# Patient Record
Sex: MALE | Race: WHITE | URBAN - METROPOLITAN AREA
[De-identification: names, ages, dates, MRNs, and addresses within clinical notes are randomized per-mention and may not be internally consistent; named-entity substitution may affect disease eponyms.]

---

## 2018-01-30 ENCOUNTER — OFFICE VISIT (OUTPATIENT)
Dept: INTERNAL MEDICINE CLINIC | Age: 63
End: 2018-01-30

## 2018-01-30 VITALS
WEIGHT: 138.8 LBS | HEIGHT: 67 IN | BODY MASS INDEX: 21.79 KG/M2 | HEART RATE: 61 BPM | TEMPERATURE: 97.8 F | SYSTOLIC BLOOD PRESSURE: 140 MMHG | OXYGEN SATURATION: 98 % | RESPIRATION RATE: 14 BRPM | DIASTOLIC BLOOD PRESSURE: 88 MMHG

## 2018-01-30 DIAGNOSIS — Z00.00 WELL ADULT EXAM: Primary | ICD-10-CM

## 2018-01-30 DIAGNOSIS — R03.0 ELEVATED BP WITHOUT DIAGNOSIS OF HYPERTENSION: ICD-10-CM

## 2018-01-30 DIAGNOSIS — E55.9 VITAMIN D DEFICIENCY: ICD-10-CM

## 2018-01-30 DIAGNOSIS — D12.6 TUBULAR ADENOMA OF COLON: ICD-10-CM

## 2018-01-30 DIAGNOSIS — H61.23 BILATERAL IMPACTED CERUMEN: ICD-10-CM

## 2018-01-30 NOTE — PATIENT INSTRUCTIONS
Earwax Blockage: Care Instructions  Your Care Instructions    Earwax is a natural substance that protects the ear canal. Normally, earwax drains from the ears and does not cause problems. Sometimes earwax builds up and hardens. Earwax blockage (also called cerumen impaction) can cause some loss of hearing and pain. When wax is tightly packed, you will need to have your doctor remove it. Follow-up care is a key part of your treatment and safety. Be sure to make and go to all appointments, and call your doctor if you are having problems. It's also a good idea to know your test results and keep a list of the medicines you take. How can you care for yourself at home? · Do not try to remove earwax with cotton swabs, fingers, or other objects. This can make the blockage worse and damage the eardrum. · If your doctor recommends that you try to remove earwax at home:  ¨ Soften and loosen the earwax with warm mineral oil. You also can try hydrogen peroxide mixed with an equal amount of room temperature water. Place 2 drops of the fluid, warmed to body temperature, in the ear two times a day for up to 5 days. ¨ Once the wax is loose and soft, all that is usually needed to remove it from the ear canal is a gentle, warm shower. Direct the water into the ear, then tip your head to let the earwax drain out. Dry your ear thoroughly with a hair dryer set on low. Hold the dryer several inches from your ear. ¨ If the warm mineral oil and shower do not work, use an over-the-counter wax softener followed by gentle flushing with an ear syringe each night for a week or two. Make sure the flushing solution is body temperature. Cool or hot fluids in the ear can cause dizziness. When should you call for help? Call your doctor now or seek immediate medical care if:  ? · Pus or blood drains from your ear. ? · Your ears are ringing or feel full. ? · You have a loss of hearing. ? Watch closely for changes in your health, and be sure to contact your doctor if:  ? · You have pain or reduced hearing after 1 week of home treatment. ? · You have any new symptoms, such as nausea or balance problems. Where can you learn more? Go to http://meghan-della.info/. Enter H237 in the search box to learn more about \"Earwax Blockage: Care Instructions. \"  Current as of: March 20, 2017  Content Version: 11.4  © 8457-1498 [employer]. Care instructions adapted under license by [employer] (which disclaims liability or warranty for this information). If you have questions about a medical condition or this instruction, always ask your healthcare professional. [place] [address] any warranty or liability for your use of this information. Learning About Vitamin D  Why is it important to get enough vitamin D? Your body needs vitamin D to absorb calcium. Calcium keeps your bones and muscles, including your heart, healthy and strong. If your muscles don't get enough calcium, they can cramp, hurt, or feel weak. You may have long-term (chronic) muscle aches and pains. If you don't get enough vitamin D throughout life, you have an increased chance of having thin and brittle bones (osteoporosis) in your later years. Children who don't get enough vitamin D may not grow as much as others their age. They also have a chance of getting a rare disease called rickets. It causes weak bones. Vitamin D and calcium are added to many foods. And your body uses sunshine to make its own vitamin D. How much vitamin D do you need? The [place] recommends that people ages 3 through 79 get 600 IU (international units) every day. Adults 71 and older need 800 IU every day. Blood tests for vitamin D can check your vitamin D level. But there is no standard normal range used by all laboratories. The [place] [employer] recommends a blood level of 20 ng/mL of vitamin D for healthy bones.  And most people in the [address] [place] [address] meet this goal.  How can you get more vitamin D? Foods that contain vitamin D include:  · [place], tuna, and mackerel. These are some of the best foods to eat when you need to get more vitamin D.  · Cheese, egg yolks, and beef liver. These foods have vitamin D in small amounts. · Milk, soy drinks, orange juice, yogurt, margarine, and some kinds of cereal have vitamin D added to them. Some people don't make vitamin D as well as others. They may have to take extra care in getting enough vitamin D. Things that reduce how much vitamin D your body makes include:  · Dark skin, such as many  Americans have. · Age, especially if you are older than 72. · Digestive problems, such as Crohn's or celiac disease. · Liver and kidney disease. Some people who do not get enough vitamin D may need supplements. Are there any risks from taking vitamin D?  · Too much vitamin D:  ¨ Can damage your kidneys. ¨ Can cause nausea and vomiting, constipation, and weakness. ¨ Raises the amount of calcium in your blood. If this happens, you can get confused or have an irregular heart rhythm. · Vitamin D may interact with other medicines. Tell your doctor about all of the medicines you take, including over-the-counter drugs, herbs, and pills. Tell your doctor about all of your current medical problems. Where can you learn more? Go to http://meghan-della.info/. Enter [id number] in the search box to learn more about \"Learning About Vitamin D.\"  Current as of: May 12, 2017  Content Version: 11.4  © 8811-7916 [employer]. Care instructions adapted under license by [employer] (which disclaims liability or warranty for this information). If you have questions about a medical condition or this instruction, always ask your healthcare professional. [place] [address] any warranty or liability for your use of this information.        Well Visit, Men 48 to 72: Care Instructions  Your Care Instructions    Physical exams can help you stay healthy. Your doctor has checked your overall health and may have suggested ways to take good care of yourself. He or she also may have recommended tests. At home, you can help prevent illness with healthy eating, regular exercise, and other steps. Follow-up care is a key part of your treatment and safety. Be sure to make and go to all appointments, and call your doctor if you are having problems. It's also a good idea to know your test results and keep a list of the medicines you take. How can you care for yourself at home? · Reach and stay at a healthy weight. This will lower your risk for many problems, such as obesity, diabetes, heart disease, and high blood pressure. · Get at least 30 minutes of exercise on most days of the week. Walking is a good choice. You also may want to do other activities, such as running, swimming, cycling, or playing tennis or team sports. · Do not smoke. Smoking can make health problems worse. If you need help quitting, talk to your doctor about stop-smoking programs and medicines. These can increase your chances of quitting for good. · Protect your skin from too much sun. When you're outdoors from 10 a.m. to 4 p.m., stay in the shade or cover up with clothing and a hat with a wide brim. Wear sunglasses that block UV rays. Even when it's cloudy, put broad-spectrum sunscreen (SPF 30 or higher) on any exposed skin. · See a dentist one or two times a year for checkups and to have your teeth cleaned. · Wear a seat belt in the car. · Limit alcohol to 2 drinks a day. Too much alcohol can cause health problems. Follow your doctor's advice about when to have certain tests. These tests can spot problems early. · Cholesterol. Your doctor will tell you how often to have this done based on your overall health and other things that can increase your risk for heart attack and stroke.   · Blood pressure. Have your blood pressure checked during a routine doctor visit. Your doctor will tell you how often to check your blood pressure based on your age, your blood pressure results, and other factors. · Prostate exam. Talk to your doctor about whether you should have a blood test (called a PSA test) for prostate cancer. Experts disagree on whether men should have this test. Some experts recommend that you discuss the benefits and risks of the test with your doctor. · Diabetes. Ask your doctor whether you should have tests for diabetes. · Vision. Some experts recommend that you have yearly exams for glaucoma and other age-related eye problems starting at age 48. · Hearing. Tell your doctor if you notice any change in your hearing. You can have tests to find out how well you hear. · Colon cancer. You should begin tests for colon cancer at age 48. You may have one of several tests. Your doctor will tell you how often to have tests based on your age and risk. Risks include whether you already had a precancerous polyp removed from your colon or whether your parent, brother, sister, or child has had colon cancer. · Heart attack and stroke risk. At least every 4 to 6 years, you should have your risk for heart attack and stroke assessed. Your doctor uses factors such as your age, blood pressure, cholesterol, and whether you smoke or have diabetes to show what your risk for a heart attack or stroke is over the next 10 years. · Abdominal aortic aneurysm. Ask your doctor whether you should have a test to check for an aneurysm. You may need a test if you ever smoked or if your parent, brother, sister, or child has had an aneurysm. When should you call for help? Watch closely for changes in your health, and be sure to contact your doctor if you have any problems or symptoms that concern you. Where can you learn more? Go to http://meghan-della.info/.   Enter E833 in the search box to learn more about \"Well Visit, Men 48 to 72: Care Instructions. \"  Current as of: May 12, 2017  Content Version: 11.4  © 5092-6038 [employer]. Care instructions adapted under license by [employer] (which disclaims liability or warranty for this information). If you have questions about a medical condition or this instruction, always ask your healthcare professional. [address] any warranty or liability for your use of this information. Elevated Blood Pressure: Care Instructions  Your Care Instructions    Blood pressure is a measure of how hard the blood pushes against the walls of your arteries. It's normal for blood pressure to go up and down throughout the day. But if it stays up over time, you have high blood pressure. Two numbers tell you your blood pressure. The first number is the systolic pressure. It shows how hard the blood pushes when your heart is pumping. The second number is the diastolic pressure. It shows how hard the blood pushes between heartbeats, when your heart is relaxed and filling with blood. An ideal blood pressure in adults is less than 120/80 (say \"120 over 80\"). High blood pressure is 140/90 or higher. You have high blood pressure if your top number is 140 or higher or your bottom number is 90 or higher, or both. The main test for high blood pressure is simple, fast, and painless. To diagnose high blood pressure, your doctor will test your blood pressure at different times. After testing your blood pressure, your doctor may ask you to test it again when you are home. If you are diagnosed with high blood pressure, you can work with your doctor to make a long-term plan to manage it. Follow-up care is a key part of your treatment and safety. Be sure to make and go to all appointments, and call your doctor if you are having problems. It's also a good idea to know your test results and keep a list of the medicines you take.   How can you care for yourself at home? · Do not smoke. Smoking increases your risk for heart attack and stroke. If you need help quitting, talk to your doctor about stop-smoking programs and medicines. These can increase your chances of quitting for good. · Stay at a healthy weight. · Try to limit how much sodium you eat to less than 2,300 milligrams (mg) a day. Your doctor may ask you to try to eat less than 1,500 mg a day. · Be physically active. Get at least 30 minutes of exercise on most days of the week. Walking is a good choice. You also may want to do other activities, such as running, swimming, cycling, or playing tennis or team sports. · Avoid or limit alcohol. Talk to your doctor about whether you can drink any alcohol. · Eat plenty of fruits, vegetables, and low-fat dairy products. Eat less saturated and total fats. · Learn how to check your blood pressure at home. When should you call for help? Call your doctor now or seek immediate medical care if:  ? · Your blood pressure is much higher than normal (such as 180/110 or higher). ? · You think high blood pressure is causing symptoms such as:  ¨ Severe headache. ¨ Blurry vision. ? Watch closely for changes in your health, and be sure to contact your doctor if:  ? · You do not get better as expected. Where can you learn more? Go to http://meghan-della.info/. Enter L369 in the search box to learn more about \"Elevated Blood Pressure: Care Instructions. \"  Current as of: September 21, 2016  Content Version: 11.4  © 1112-7292 [employer]. Care instructions adapted under license by [employer] (which disclaims liability or warranty for this information). If you have questions about a medical condition or this instruction, always ask your healthcare professional. [address] any warranty or liability for your use of this information.

## 2018-01-30 NOTE — PROGRESS NOTES
HISTORY OF PRESENT ILLNESS  Hudson Chandler is a 60 y.o. male. HPI  Hudson Chandler is here for complete health maintenance physical exam and screening. he does have other concerns. Has been Vitamin D deficient in the past and has been taking 2000 units of Vitamin D3 daily. He has multiple questions regarding Medicare and what is covered and not covered. Health maintenance hx includes:  Exercise: moderately active. Form of exercise: uses recumbent bike every other day   Diet: generally follows a low fat low cholesterol diet  retired  Cancer screening:    Colon cancer screening:  Last Colonoscopy: 2013 and was advised to follow up in 5 years; has history of tubular adenoma   Prostate cancer screening: PSA and/or SHARON:   Lab Results   Component Value Date/Time    Prostate Specific Ag 0.4 12/30/2016 10:47 AM    Prostate Specific Ag 0.4 12/31/2015 12:05 PM    Prostate Specific Ag 0.6 12/31/2014 11:21 AM    Prostate Specific Ag 0.2 12/09/2009 11:35 AM          Lab Results   Component Value Date/Time    Cholesterol, total 222 12/30/2016 10:47 AM    HDL Cholesterol 116 12/30/2016 10:47 AM    LDL, calculated 93 12/30/2016 10:47 AM    VLDL, calculated 13 12/30/2016 10:47 AM    Triglyceride 64 12/30/2016 10:47 AM    CHOL/HDL Ratio 2.1 12/09/2009 11:35 AM       Lab Results   Component Value Date/Time    Glucose 94 12/30/2016 10:47 AM         Immunizations:     Immunization History   Administered Date(s) Administered    Influenza Vaccine 09/29/2013, 09/23/2014, 10/05/2015, 10/14/2016, 09/18/2017    TDAP Vaccine 11/10/2011    Zoster Vaccine, Live 08/19/2015      Immunization status: up to date and documented.        Social History     Social History    Marital status:      Spouse name: Edgardo Stewart Number of children: 2    Years of education: N/A     Occupational History    Disability due to leg injures      Social History Main Topics    Smoking status: Never Smoker    Smokeless tobacco: Never Used   Haily Wallace Alcohol use 6.6 oz/week     5 Standard drinks or equivalent, 6 Cans of beer per week    Drug use: No    Sexual activity: Yes     Partners: Female     Other Topics Concern    Not on file     Social History Narrative    2 children Wily Crouch 26, [id number]     Past Surgical History:   Procedure Laterality Date    COLONOSCOPY  12.23.09    2 tubular adenomas, Dr. Colby Zepeda, repeat 12/2012    HX COLONOSCOPY  7/2013    HX FREE SKIN GRAFT      legs     Family History   Problem Relation Age of Onset    Adopted: Yes    No Known Problems Daughter     No Known Problems Son      Current Outpatient Prescriptions on File Prior to Visit   Medication Sig Dispense Refill    cholecalciferol (VITAMIN D3) 1,000 unit cap Take 2,000 Units by mouth daily. No current facility-administered medications on file prior to visit. .  Review of Systems   Constitutional: Negative for chills, fever and malaise/fatigue. HENT: Negative for congestion, ear pain, sinus pain and sore throat. Respiratory: Negative for cough and shortness of breath. Cardiovascular: Negative for chest pain, palpitations and leg swelling. Gastrointestinal: Negative for abdominal pain, blood in stool, constipation, diarrhea, nausea and vomiting. Genitourinary: Negative for dysuria, frequency and hematuria. Musculoskeletal: Negative for myalgias. Skin: Negative for rash. Neurological: Negative for dizziness, tingling and headaches. Psychiatric/Behavioral: Negative for depression. The patient is nervous/anxious. /88 (BP 1 Location: Right arm, BP Patient Position: Sitting)  Pulse 61  Temp 97.8 °F (36.6 °C) (Oral)   Resp 14  Ht 5' 7\" (1.702 m)  Wt 138 lb 12.8 oz (63 kg)  SpO2 98%  BMI 21.74 kg/m2  Physical Exam   Constitutional: He is oriented to person, place, and time. He appears well-developed and well-nourished. HENT:   Head: Normocephalic and atraumatic.    Right Ear: External ear normal.   Left Ear: External ear normal. Nose: Nose normal.   Mouth/Throat: Oropharynx is clear and moist.   Eyes: Conjunctivae are normal.   Neck: Normal range of motion. Neck supple. Cardiovascular: Normal rate, regular rhythm and normal heart sounds. Pulmonary/Chest: Effort normal and breath sounds normal. He has no wheezes. Abdominal: Soft. Bowel sounds are normal. There is no tenderness. There is no rebound. Musculoskeletal:   Large scars bilateral lower legs   Neurological: He is alert and oriented to person, place, and time. Skin: Skin is warm and dry. Psychiatric: He has a normal mood and affect. His behavior is normal.   Nursing note and vitals reviewed. ASSESSMENT and PLAN  Diagnoses and all orders for this visit:    1. Well adult exam - advised him to check Medicare website and other supplemental insurances to see what is covered as this is varied. -     CBC WITH AUTOMATED DIFF  -     LIPID PANEL  -     METABOLIC PANEL, COMPREHENSIVE  -     PROSTATE SPECIFIC AG  -     TSH 3RD GENERATION  -     URINALYSIS W/ RFLX MICROSCOPIC  -     AMB POC FECAL BLOOD, OCCULT, QL 1 CARD    2. Tubular adenoma of colon -- due to follow up with colonoscopy every 5 years  -     REFERRAL TO GASTROENTEROLOGY    3. Vitamin D deficiency  -     VITAMIN D, 25 HYDROXY    4. Bilateral impacted cerumen -- large amount of cerumen removed from bilateral ear canals via irrigation  -     IN REMOVAL IMPACTED CERUMEN IRRIGATION/LVG UNILAT    5.  Elevated BP without diagnosis of hypertension -- advised to check his blood pressures outside of medical office      lab results and schedule of future lab studies reviewed with patient  reviewed diet, exercise and weight control  cardiovascular risk and specific lipid/LDL goals reviewed  reviewed medications and side effects in detail

## 2018-01-30 NOTE — MR AVS SNAPSHOT
[address] [place] [address] 
 
 
 [address] [place] [address] [facility] 
[phone] Patient: Fiordaliza Marti MRN: [id number] JMF:[id number] Visit Information Date & Time Provider Department Dept. Phone Encounter #  
 1/30/2018 10:00 AM Maritza Ferrari NP [facility] [address]  [id number] Upcoming Health Maintenance Date Due Hepatitis C Screening 1955 DTaP/Tdap/Td series (2 - Td) 11/10/2021 COLONOSCOPY 11/9/2023 Allergies as of 1/30/2018  Review Complete On: 1/30/2018 By: Maritza Ferrari NP Severity Noted Reaction Type Reactions Shellfish Containing Products  09/01/2010   Side Effect Hives Current Immunizations  Reviewed on 1/30/2018 Name Date Influenza Vaccine 9/18/2017, 10/14/2016, 10/5/2015, 9/23/2014, 9/29/2013 TDAP Vaccine 11/10/2011 Zoster Vaccine, Live 8/19/2015 Reviewed by Maritza Ferrari NP on 1/30/2018 at 10:33 AM  
You Were Diagnosed With   
  
 Codes Comments Well adult exam    -  Primary ICD-10-CM: Z00.00 ICD-9-CM: V70.0 Tubular adenoma of colon     ICD-10-CM: D12.6 ICD-9-CM: 211.3 Vitamin D deficiency     ICD-10-CM: E55.9 ICD-9-CM: 268.9 Bilateral impacted cerumen     ICD-10-CM: H61.23 
ICD-9-CM: 380.4 Elevated BP without diagnosis of hypertension     ICD-10-CM: R03.0 ICD-9-CM: 796.2 Vitals BP Pulse Temp Resp Height(growth percentile) Weight(growth percentile) 140/88 (BP 1 Location: Right arm, BP Patient Position: Sitting) 61 97.8 °F (36.6 °C) (Oral) 14 5' 7\" (1.702 m) 138 lb 12.8 oz (63 kg) SpO2 BMI Smoking Status 98% 21.74 kg/m2 Never Smoker Vitals History BMI and BSA Data Body Mass Index Body Surface Area 21.74 kg/m 2 1.73 m 2 Preferred Pharmacy Pharmacy Name Phone [id number] [place], [address] ([place] [address]) [phone] Your Updated Medication List  
  
   
This list is accurate as of: 1/30/18 10:48 AM.  Always use your most recent med list.  
  
  
  
  
 VITAMIN D3 1,000 unit Cap Generic drug:  cholecalciferol Take 2,000 Units by mouth daily. We Performed the Following AMB POC FECAL BLOOD, OCCULT, QL 1 CARD [89827 CPT(R)] CBC WITH AUTOMATED DIFF [41384 CPT(R)] LIPID PANEL [86951 CPT(R)] METABOLIC PANEL, COMPREHENSIVE [31667 CPT(R)] VA REMOVAL IMPACTED CERUMEN IRRIGATION/LVG UNILAT [07727 CPT(R)] PSA, DIAGNOSTIC (PROSTATE SPECIFIC AG) [id number] CPT(R)] REFERRAL TO GASTROENTEROLOGY [UBI38 Custom] TSH 3RD GENERATION [00721 CPT(R)] URINALYSIS W/ RFLX MICROSCOPIC [86284 CPT(R)] VITAMIN D, 25 HYDROXY [id number] CPT(R)] Referral Information Referral ID Referred By Referred To  
  
 [id number] Solange Quintero MD   
   [address] Phone: [phone] Fax: [phone] Visits Status Start Date End Date 1 New Request 1/30/18 1/30/19 If your referral has a status of pending review or denied, additional information will be sent to support the outcome of this decision. Patient Instructions Earwax Blockage: Care Instructions Your Care Instructions Earwax is a natural substance that protects the ear canal. Normally, earwax drains from the ears and does not cause problems. Sometimes earwax builds up and hardens. Earwax blockage (also called cerumen impaction) can cause some loss of hearing and pain. When wax is tightly packed, you will need to have your doctor remove it. Follow-up care is a key part of your treatment and safety. Be sure to make and go to all appointments, and call your doctor if you are having problems. It's also a good idea to know your test results and keep a list of the medicines you take. How can you care for yourself at home? · Do not try to remove earwax with cotton swabs, fingers, or other objects. This can make the blockage worse and damage the eardrum. · If your doctor recommends that you try to remove earwax at home: ¨ Soften and loosen the earwax with warm mineral oil. You also can try hydrogen peroxide mixed with an equal amount of room temperature water. Place 2 drops of the fluid, warmed to body temperature, in the ear two times a day for up to 5 days. ¨ Once the wax is loose and soft, all that is usually needed to remove it from the ear canal is a gentle, warm shower. Direct the water into the ear, then tip your head to let the earwax drain out. Dry your ear thoroughly with a hair dryer set on low. Hold the dryer several inches from your ear. ¨ If the warm mineral oil and shower do not work, use an over-the-counter wax softener followed by gentle flushing with an ear syringe each night for a week or two. Make sure the flushing solution is body temperature. Cool or hot fluids in the ear can cause dizziness. When should you call for help? Call your doctor now or seek immediate medical care if: 
? · Pus or blood drains from your ear. ? · Your ears are ringing or feel full. ? · You have a loss of hearing. ? Watch closely for changes in your health, and be sure to contact your doctor if: 
? · You have pain or reduced hearing after 1 week of home treatment. ? · You have any new symptoms, such as nausea or balance problems. Where can you learn more? Go to http://meghan-della.info/. Enter F137 in the search box to learn more about \"Earwax Blockage: Care Instructions. \" Current as of: March 20, 2017 Content Version: 11.4 © 6166-6991 [employer]. Care instructions adapted under license by [employer] (which disclaims liability or warranty for this information).  If you have questions about a medical condition or this instruction, always ask your healthcare professional. [address] any warranty or liability for your use of this information. Learning About Vitamin D Why is it important to get enough vitamin D? Your body needs vitamin D to absorb calcium. Calcium keeps your bones and muscles, including your heart, healthy and strong. If your muscles don't get enough calcium, they can cramp, hurt, or feel weak. You may have long-term (chronic) muscle aches and pains. If you don't get enough vitamin D throughout life, you have an increased chance of having thin and brittle bones (osteoporosis) in your later years. Children who don't get enough vitamin D may not grow as much as others their age. They also have a chance of getting a rare disease called rickets. It causes weak bones. Vitamin D and calcium are added to many foods. And your body uses sunshine to make its own vitamin D. How much vitamin D do you need? The [place] of Medicine recommends that people ages 3 through 79 get 600 IU (international units) every day. Adults 71 and older need 800 IU every day. Blood tests for vitamin D can check your vitamin D level. But there is no standard normal range used by all laboratories. The [place] of Medicine recommends a blood level of 20 ng/mL of vitamin D for healthy bones. And most people in the [place] and [facility]) meet this goal. 
How can you get more vitamin D? Foods that contain vitamin D include: 
· Alto, tuna, and mackerel. These are some of the best foods to eat when you need to get more vitamin D. 
· Cheese, egg yolks, and beef liver. These foods have vitamin D in small amounts. · Milk, soy drinks, orange juice, yogurt, margarine, and some kinds of cereal have vitamin D added to them. Some people don't make vitamin D as well as others. They may have to take extra care in getting enough vitamin D. Things that reduce how much vitamin D your body makes include: · Dark skin, such as many  Americans have. · Age, especially if you are older than 72. · Digestive problems, such as Crohn's or celiac disease. · Liver and kidney disease. Some people who do not get enough vitamin D may need supplements. Are there any risks from taking vitamin D? 
· Too much vitamin D: 
¨ Can damage your kidneys. ¨ Can cause nausea and vomiting, constipation, and weakness. ¨ Raises the amount of calcium in your blood. If this happens, you can get confused or have an irregular heart rhythm. · Vitamin D may interact with other medicines. Tell your doctor about all of the medicines you take, including over-the-counter drugs, herbs, and pills. Tell your doctor about all of your current medical problems. Where can you learn more? Go to http://meghan-della.info/. Enter [id number] in the search box to learn more about \"Learning About Vitamin D.\" 
Current as of: May 12, 2017 Content Version: 11.4 © 6971-6941 [employer]. Care instructions adapted under license by [employer] (which disclaims liability or warranty for this information). If you have questions about a medical condition or this instruction, always ask your healthcare professional. [place] [address] any warranty or liability for your use of this information. Well Visit, Men 48 to 72: Care Instructions Your Care Instructions Physical exams can help you stay healthy. Your doctor has checked your overall health and may have suggested ways to take good care of yourself. He or she also may have recommended tests. At home, you can help prevent illness with healthy eating, regular exercise, and other steps. Follow-up care is a key part of your treatment and safety. Be sure to make and go to all appointments, and call your doctor if you are having problems. It's also a good idea to know your test results and keep a list of the medicines you take. How can you care for yourself at home? · Reach and stay at a healthy weight. This will lower your risk for many problems, such as obesity, diabetes, heart disease, and high blood pressure. · Get at least 30 minutes of exercise on most days of the week. Walking is a good choice. You also may want to do other activities, such as running, swimming, cycling, or playing tennis or team sports. · Do not smoke. Smoking can make health problems worse. If you need help quitting, talk to your doctor about stop-smoking programs and medicines. These can increase your chances of quitting for good. · Protect your skin from too much sun. When you're outdoors from 10 a.m. to 4 p.m., stay in the shade or cover up with clothing and a hat with a wide brim. Wear sunglasses that block UV rays. Even when it's cloudy, put broad-spectrum sunscreen (SPF 30 or higher) on any exposed skin. · See a dentist one or two times a year for checkups and to have your teeth cleaned. · Wear a seat belt in the car. · Limit alcohol to 2 drinks a day. Too much alcohol can cause health problems. Follow your doctor's advice about when to have certain tests. These tests can spot problems early. · Cholesterol. Your doctor will tell you how often to have this done based on your overall health and other things that can increase your risk for heart attack and stroke. · Blood pressure. Have your blood pressure checked during a routine doctor visit. Your doctor will tell you how often to check your blood pressure based on your age, your blood pressure results, and other factors. · Prostate exam. Talk to your doctor about whether you should have a blood test (called a PSA test) for prostate cancer. Experts disagree on whether men should have this test. Some experts recommend that you discuss the benefits and risks of the test with your doctor. · Diabetes. Ask your doctor whether you should have tests for diabetes. · Vision. Some experts recommend that you have yearly exams for glaucoma and other age-related eye problems starting at age 48. · Hearing. Tell your doctor if you notice any change in your hearing. You can have tests to find out how well you hear. · Colon cancer. You should begin tests for colon cancer at age 48. You may have one of several tests. Your doctor will tell you how often to have tests based on your age and risk. Risks include whether you already had a precancerous polyp removed from your colon or whether your parent, brother, sister, or child has had colon cancer. · Heart attack and stroke risk. At least every 4 to 6 years, you should have your risk for heart attack and stroke assessed. Your doctor uses factors such as your age, blood pressure, cholesterol, and whether you smoke or have diabetes to show what your risk for a heart attack or stroke is over the next 10 years. · Abdominal aortic aneurysm. Ask your doctor whether you should have a test to check for an aneurysm. You may need a test if you ever smoked or if your parent, brother, sister, or child has had an aneurysm. When should you call for help? Watch closely for changes in your health, and be sure to contact your doctor if you have any problems or symptoms that concern you. Where can you learn more? Go to http://meghan-della.info/. Enter E201 in the search box to learn more about \"Well Visit, Men 48 to 72: Care Instructions. \" Current as of: May 12, 2017 Content Version: 11.4 © 0052-4909 [employer]. Care instructions adapted under license by [employer] (which disclaims liability or warranty for this information). If you have questions about a medical condition or this instruction, always ask your healthcare professional. [place] [address] any warranty or liability for your use of this information. Elevated Blood Pressure: Care Instructions Your Care Instructions Blood pressure is a measure of how hard the blood pushes against the walls of your arteries. It's normal for blood pressure to go up and down throughout the day. But if it stays up over time, you have high blood pressure. Two numbers tell you your blood pressure. The first number is the systolic pressure. It shows how hard the blood pushes when your heart is pumping. The second number is the diastolic pressure. It shows how hard the blood pushes between heartbeats, when your heart is relaxed and filling with blood. An ideal blood pressure in adults is less than 120/80 (say \"120 over 80\"). High blood pressure is 140/90 or higher. You have high blood pressure if your top number is 140 or higher or your bottom number is 90 or higher, or both. The main test for high blood pressure is simple, fast, and painless. To diagnose high blood pressure, your doctor will test your blood pressure at different times. After testing your blood pressure, your doctor may ask you to test it again when you are home. If you are diagnosed with high blood pressure, you can work with your doctor to make a long-term plan to manage it. Follow-up care is a key part of your treatment and safety. Be sure to make and go to all appointments, and call your doctor if you are having problems. It's also a good idea to know your test results and keep a list of the medicines you take. How can you care for yourself at home? · Do not smoke. Smoking increases your risk for heart attack and stroke. If you need help quitting, talk to your doctor about stop-smoking programs and medicines. These can increase your chances of quitting for good. · Stay at a healthy weight. · Try to limit how much sodium you eat to less than 2,300 milligrams (mg) a day. Your doctor may ask you to try to eat less than 1,500 mg a day. · Be physically active. Get at least 30 minutes of exercise on most days of the week. Walking is a good choice.  You also may want to do other activities, such as running, swimming, cycling, or playing tennis or team sports. · Avoid or limit alcohol. Talk to your doctor about whether you can drink any alcohol. · Eat plenty of fruits, vegetables, and low-fat dairy products. Eat less saturated and total fats. · Learn how to check your blood pressure at home. When should you call for help? Call your doctor now or seek immediate medical care if: 
? · Your blood pressure is much higher than normal (such as 180/110 or higher). ? · You think high blood pressure is causing symptoms such as: ¨ Severe headache. ¨ Blurry vision. ? Watch closely for changes in your health, and be sure to contact your doctor if: 
? · You do not get better as expected. Where can you learn more? Go to http://meghan-della.info/. Enter L057 in the search box to learn more about \"Elevated Blood Pressure: Care Instructions. \" Current as of: September 21, 2016 Content Version: 11.4 © 7543-5689 [employer]. Care instructions adapted under license by [employer] (which disclaims liability or warranty for this information). If you have questions about a medical condition or this instruction, always ask your healthcare professional. [address] any warranty or liability for your use of this information. Introducing [facility] [place] [facility]! Leticia Ly introduces TechDevils patient portal. Now you can access parts of your medical record, email your doctor's office, and request medication refills online. 1. In your internet browser, go to [email or web address] 2. Click on the First Time User? Click Here link in the Sign In box. You will see the New Member Sign Up page. 3. Enter your TechDevils Access Code exactly as it appears below. You will not need to use this code after youve completed the sign-up process. If you do not sign up before the expiration date, you must request a new code. · WorldDoc Access Code: [email or web address] Expires: 4/30/2018 10:35 AM 
 
4. Enter the last four digits of your Social Security Number (xxxx) and Date of Birth (mm/dd/yyyy) as indicated and click Submit. You will be taken to the next sign-up page. 5. Create a WorldDoc ID. This will be your WorldDoc login ID and cannot be changed, so think of one that is secure and easy to remember. 6. Create a WorldDoc password. You can change your password at any time. 7. Enter your Password Reset Question and Answer. This can be used at a later time if you forget your password. 8. Enter your e-mail address. You will receive e-mail notification when new information is available in [address] 9. Click Sign Up. You can now view and download portions of your medical record. 10. Click the Download Summary menu link to download a portable copy of your medical information. If you have questions, please visit the Frequently Asked Questions section of the WorldDoc website. Remember, WorldDoc is NOT to be used for urgent needs. For medical emergencies, dial 911. Now available from your iPhone and Android! Please provide this summary of care documentation to your next provider. Your primary care clinician is listed as Rachel Diego. If you have any questions after today's visit, please call [phone].

## 2018-01-31 LAB
25(OH)D3+25(OH)D2 SERPL-MCNC: 48.2 NG/ML (ref 30–100)
ALBUMIN SERPL-MCNC: 4.4 G/DL (ref 3.6–4.8)
ALBUMIN/GLOB SERPL: 1.8 {RATIO} (ref 1.2–2.2)
ALP SERPL-CCNC: 43 IU/L (ref 39–117)
ALT SERPL-CCNC: 19 IU/L (ref 0–44)
APPEARANCE UR: ABNORMAL
AST SERPL-CCNC: 24 IU/L (ref 0–40)
BASOPHILS # BLD AUTO: 0 X10E3/UL (ref 0–0.2)
BASOPHILS NFR BLD AUTO: 0 %
BILIRUB SERPL-MCNC: 0.9 MG/DL (ref 0–1.2)
BILIRUB UR QL STRIP: NEGATIVE
BUN SERPL-MCNC: 10 MG/DL (ref 8–27)
BUN/CREAT SERPL: 11 (ref 10–24)
CALCIUM SERPL-MCNC: 9.5 MG/DL (ref 8.6–10.2)
CHLORIDE SERPL-SCNC: 101 MMOL/L (ref 96–106)
CHOLEST SERPL-MCNC: 211 MG/DL (ref 100–199)
CO2 SERPL-SCNC: 26 MMOL/L (ref 18–29)
COLOR UR: YELLOW
CREAT SERPL-MCNC: 0.93 MG/DL (ref 0.76–1.27)
EOSINOPHIL # BLD AUTO: 0.1 X10E3/UL (ref 0–0.4)
EOSINOPHIL NFR BLD AUTO: 1 %
ERYTHROCYTE [DISTWIDTH] IN BLOOD BY AUTOMATED COUNT: 13.6 % (ref 12.3–15.4)
GFR SERPLBLD CREATININE-BSD FMLA CKD-EPI: 101 ML/MIN/1.73
GFR SERPLBLD CREATININE-BSD FMLA CKD-EPI: 87 ML/MIN/1.73
GLOBULIN SER CALC-MCNC: 2.4 G/DL (ref 1.5–4.5)
GLUCOSE SERPL-MCNC: 98 MG/DL (ref 65–99)
GLUCOSE UR QL: NEGATIVE
HCT VFR BLD AUTO: 45.7 % (ref 37.5–51)
HDLC SERPL-MCNC: 110 MG/DL
HEMOCCULT STL QL: NEGATIVE
HGB BLD-MCNC: 15.8 G/DL (ref 13–17.7)
HGB UR QL STRIP: NEGATIVE
IMM GRANULOCYTES # BLD: 0 X10E3/UL (ref 0–0.1)
IMM GRANULOCYTES NFR BLD: 0 %
INTERPRETATION, 910389: NORMAL
KETONES UR QL STRIP: NEGATIVE
LDLC SERPL CALC-MCNC: 89 MG/DL (ref 0–99)
LEUKOCYTE ESTERASE UR QL STRIP: NEGATIVE
LYMPHOCYTES # BLD AUTO: 0.7 X10E3/UL (ref 0.7–3.1)
LYMPHOCYTES NFR BLD AUTO: 15 %
MCH RBC QN AUTO: 33.1 PG (ref 26.6–33)
MCHC RBC AUTO-ENTMCNC: 34.6 G/DL (ref 31.5–35.7)
MCV RBC AUTO: 96 FL (ref 79–97)
MICRO URNS: ABNORMAL
MONOCYTES # BLD AUTO: 0.6 X10E3/UL (ref 0.1–0.9)
MONOCYTES NFR BLD AUTO: 14 %
NEUTROPHILS # BLD AUTO: 3.1 X10E3/UL (ref 1.4–7)
NEUTROPHILS NFR BLD AUTO: 70 %
NITRITE UR QL STRIP: NEGATIVE
PH UR STRIP: 8 [PH] (ref 5–7.5)
PLATELET # BLD AUTO: 229 X10E3/UL (ref 150–379)
POTASSIUM SERPL-SCNC: 4.8 MMOL/L (ref 3.5–5.2)
PROT SERPL-MCNC: 6.8 G/DL (ref 6–8.5)
PROT UR QL STRIP: NEGATIVE
PSA SERPL-MCNC: 0.4 NG/ML (ref 0–4)
RBC # BLD AUTO: 4.77 X10E6/UL (ref 4.14–5.8)
SODIUM SERPL-SCNC: 141 MMOL/L (ref 134–144)
SP GR UR: 1.02 (ref 1–1.03)
TRIGL SERPL-MCNC: 58 MG/DL (ref 0–149)
TSH SERPL DL<=0.005 MIU/L-ACNC: 1.28 UIU/ML (ref 0.45–4.5)
UROBILINOGEN UR STRIP-MCNC: 0.2 MG/DL (ref 0.2–1)
VALID INTERNAL CONTROL?: YES
VLDLC SERPL CALC-MCNC: 12 MG/DL (ref 5–40)
WBC # BLD AUTO: 4.4 X10E3/UL (ref 3.4–10.8)

## 2019-07-12 ENCOUNTER — OFFICE VISIT (OUTPATIENT)
Dept: INTERNAL MEDICINE CLINIC | Age: 64
End: 2019-07-12

## 2019-07-12 ENCOUNTER — TELEPHONE (OUTPATIENT)
Dept: INTERNAL MEDICINE CLINIC | Age: 64
End: 2019-07-12

## 2019-07-12 ENCOUNTER — HOSPITAL ENCOUNTER (OUTPATIENT)
Dept: LAB | Age: 64
Discharge: HOME | End: 2019-07-12
Payer: MEDICARE

## 2019-07-12 VITALS
HEART RATE: 59 BPM | BODY MASS INDEX: 21.5 KG/M2 | OXYGEN SATURATION: 98 % | DIASTOLIC BLOOD PRESSURE: 90 MMHG | RESPIRATION RATE: 18 BRPM | TEMPERATURE: 98 F | WEIGHT: 137 LBS | HEIGHT: 67 IN | SYSTOLIC BLOOD PRESSURE: 138 MMHG

## 2019-07-12 DIAGNOSIS — R03.0 ELEVATED BP WITHOUT DIAGNOSIS OF HYPERTENSION: ICD-10-CM

## 2019-07-12 DIAGNOSIS — Z00.00 WELCOME TO MEDICARE PREVENTIVE VISIT: Primary | ICD-10-CM

## 2019-07-12 DIAGNOSIS — E55.9 VITAMIN D DEFICIENCY: ICD-10-CM

## 2019-07-12 DIAGNOSIS — Z71.89 ADVANCED DIRECTIVES, COUNSELING/DISCUSSION: ICD-10-CM

## 2019-07-12 DIAGNOSIS — Z11.59 ENCOUNTER FOR HEPATITIS C SCREENING TEST FOR LOW RISK PATIENT: ICD-10-CM

## 2019-07-12 PROCEDURE — 36415 COLL VENOUS BLD VENIPUNCTURE: CPT

## 2019-07-12 PROCEDURE — 86803 HEPATITIS C AB TEST: CPT

## 2019-07-12 PROCEDURE — 80053 COMPREHEN METABOLIC PANEL: CPT

## 2019-07-12 PROCEDURE — 84153 ASSAY OF PSA TOTAL: CPT

## 2019-07-12 PROCEDURE — 82306 VITAMIN D 25 HYDROXY: CPT

## 2019-07-12 PROCEDURE — 80061 LIPID PANEL: CPT

## 2019-07-12 NOTE — PATIENT INSTRUCTIONS
Medicare Wellness Visit, Male The best way to live healthy is to have a lifestyle where you eat a well-balanced diet, exercise regularly, limit alcohol use, and quit all forms of tobacco/nicotine, if applicable. Regular preventive services are another way to keep healthy. Preventive services (vaccines, screening tests, monitoring & exams) can help personalize your care plan, which helps you manage your own care. Screening tests can find health problems at the earliest stages, when they are easiest to treat. [address] [facility] follows the current, evidence-based guidelines published by the [facility] ([facility]) when recommending preventive services for our patients. Because we follow these guidelines, sometimes recommendations change over time as research supports it. (For example, a prostate screening blood test is no longer routinely recommended for men with no symptoms.) Of course, you and your doctor may decide to screen more often for some diseases, based on your risk and co-morbidities (chronic disease you are already diagnosed with). Preventive services for you include: - Medicare offers their members a free annual wellness visit, which is time for you and your primary care provider to discuss and plan for your preventive service needs. Take advantage of this benefit every year! 
-All adults over age 72 should receive the recommended pneumonia vaccines. Current USPSTF guidelines recommend a series of two vaccines for the best pneumonia protection.  
-All adults should have a flu vaccine yearly and an ECG.  All adults age 61 and older should receive a shingles vaccine once in their lifetime.   
-All adults age 38-68 who are overweight should have a diabetes screening test once every three years.  
-Other screening tests & preventive services for persons with diabetes include: an eye exam to screen for diabetic retinopathy, a kidney function test, a foot exam, and stricter control over your cholesterol.  
-Cardiovascular screening for adults with routine risk involves an electrocardiogram (ECG) at intervals determined by the provider.  
-Colorectal cancer screening should be done for adults age 54-65 with no increased risk factors for colorectal cancer. There are a number of acceptable methods of screening for this type of cancer. Each test has its own benefits and drawbacks. Discuss with your provider what is most appropriate for you during your annual wellness visit. The different tests include: colonoscopy (considered the best screening method), a fecal occult blood test, a fecal DNA test, and sigmoidoscopy. 
-All adults born between [facility] should be screened once for Hepatitis C. 
-An Abdominal Aortic Aneurysm (AAA) Screening is recommended for men age 73-68 who has ever smoked in their lifetime. Here is a list of your current Health Maintenance items (your personalized list of preventive services) with a due date: 
Health Maintenance Due Topic Date Due  
 Hepatitis C Test  1955  Glaucoma Screening   03/02/2019  Shingles Vaccine (2 of 2) 07/11/2019

## 2019-07-12 NOTE — PROGRESS NOTES
This is a \"Welcome to [employer]"  Initial Preventive Physical Examination (IPPE) providing Personalized Prevention Plan Services (Performed in the first 12 months of enrollment)    I have reviewed the patient's medical history in detail and updated the computerized patient record. He is  since fall 2018  Doing well . History     Past Medical History:   Diagnosis Date    Cerumen impaction     Dermatitis     Dr. 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 hx.  2 polyps, Dr. Valery Andrews      Past Surgical History:   Procedure Laterality Date    COLONOSCOPY  12.23.09    2 tubular adenomas, Dr. Valery Andrews, repeat 12/2012    HX COLONOSCOPY  7/2013    HX COLONOSCOPY  07/30/2018    polyps.  HX FREE SKIN GRAFT      legs     Current Outpatient Medications   Medication Sig Dispense Refill    cholecalciferol (VITAMIN D3) 1,000 unit cap Take 2,000 Units by mouth daily. Allergies   Allergen Reactions    Shellfish Containing Products Hives     Family History   Adopted: Yes   Problem Relation Age of Onset    No Known Problems Daughter     No Known Problems Son      Social History     Tobacco Use    Smoking status: Never Smoker    Smokeless tobacco: Never Used   Substance Use Topics    Alcohol use: Yes     Alcohol/week: 6.6 oz     Types: 5 Standard drinks or equivalent, 6 Cans of beer per week     Diet, Lifestyle: No special diet    Exercise level: moderately active    Depression Risk Screen     3 most recent PHQ Screens 7/12/2019   PHQ Not Done -   Little interest or pleasure in doing things Not at all   Feeling down, depressed, irritable, or hopeless Not at all   Total Score PHQ 2 0     Alcohol Risk Screen   You do not drink alcohol or very rarely. Functional Ability and Level of Safety   Hearing Loss  Hearing is good. Vision Screening  Vision is good.   No exam data present      Activities of Daily Living  The home contains: no safety equipment. Patient does total self care    Fall Risk Screen  Fall Risk Assessment, last 12 mths 7/12/2019   Able to walk? Yes   Fall in past 12 months? No       Abuse Screen  Patient is not abused    Screening EKG   EKG order placed: Yes    Patient Care Team   Patient Care Team:  Wang Jackson MD as PCP - General     End of Life Planning   Advanced care planning directives were discussed with the patient and /or family/caregiver. Assessment/Plan   Education and counseling provided:  Are appropriate based on today's review and evaluation    Diagnoses and all orders for this visit:    1. Welcome to Medicare preventive visit  -     AMB POC EKG ROUTINE W/ 12 LEADS, SCREEN ()  -     PSA W/ REFLX FREE PSA  -     METABOLIC PANEL, COMPREHENSIVE  -     LIPID PANEL    2. Advanced directives, counseling/discussion    3. Encounter for hepatitis C screening test for low risk patient  -     HCV AB W/RFLX TO EJ    4. Elevated BP without diagnosis of hypertension  -Purchase a blood pressure cuff that goes around your upper arm and check blood pressure at least 3 times per week. Write down your numbers for review. Check blood pressure in the morning and evening. Rest for 5 minutes with feet elevated and arm resting on a table (at mid-chest level) when checking blood pressure  -Exercise 30-60 minutes most days of the week, preferably with a mix of cardiovascular and strength training. Exercise can improve blood pressure, even if you do not lose weight!  -Limit alcohol intake to less than 2-3 drinks daily   -Avoid tobacco products  -Avoid caffeine, energy drinks, stimulants  -DASH diet - includes fruits, vegetables, fiber, and less than 2000 mg sodium (salt) daily. Try to eat less than 5582-7502 calories daily.    Limit fat intake.    -Avoid non-steroidal inflammatory medications (NSAIDS) such as ibuprofen, advil, motrin, aleve, and naproxyn as these may increase blood pressure if used long-term  -Avoid OTC decongestants such as pseudopherine, phenylephrine, Afrin  -     METABOLIC PANEL, COMPREHENSIVE  -     LIPID PANEL    5. Vitamin D deficiency  -     VITAMIN D, 25 HYDROXY        Health Maintenance Due   Topic Date Due    Hepatitis C Screening  1955    GLAUCOMA SCREENING Q2Y  03/02/2019    Shingrix Vaccine Age 50> (2 of 2) 07/11/2019

## 2019-07-12 NOTE — ACP (ADVANCE CARE PLANNING)
Advance Care Planning (ACP) Provider Note - Comprehensive     Date of ACP Conversation: 07/12/19  Persons included in Conversation:  patient  Length of ACP Conversation in minutes:  <16 minutes (Non-Billable)    Authorized Decision Maker (if patient is incapable of making informed decisions): This person is:  Healthcare Agent/Medical Power of  under Advance Directive          General ACP for ALL Patients with Decision Making Capacity:   Importance of advance care planning, including choosing a healthcare agent to communicate patient's healthcare decisions if patient lost the ability to make decisions, such as after a sudden illness or accident  Understanding of the healthcare agent role was assessed and information provided  Exploration of values, goals, and preferences if recovery is not expected, even with continued medical treatment in the event of: Imminent death  Severe, permanent brain injury    Review of Existing Advance Directive:  not availble     For Serious or Chronic Illness:  Understanding of medical condition    Understanding of CPR, goals and expected outcomes, benefits and burdens discussed. Information on CPR success rates provided (e.g. for CPR in hospital, survival to d/c at two weeks is 22%, for chronically ill or elderly/frail survival is less than 3%); Individual asked to communicate understanding of information in his/her own words.   Explored fears and concerns regarding CPR or possible outcomes    Interventions Provided:  Recommended completion of Advance Directive form after review of ACP materials and conversation with prospective healthcare agent   Recommended communicating the plan and making copies for the healthcare agent, personal physician, and others as appropriate (e.g., health system)

## 2019-07-13 LAB
25(OH)D3+25(OH)D2 SERPL-MCNC: 43.4 NG/ML (ref 30–100)
ALBUMIN SERPL-MCNC: 4.6 G/DL (ref 3.6–4.8)
ALBUMIN/GLOB SERPL: 1.9 {RATIO} (ref 1.2–2.2)
ALP SERPL-CCNC: 34 IU/L (ref 39–117)
ALT SERPL-CCNC: 19 IU/L (ref 0–44)
AST SERPL-CCNC: 25 IU/L (ref 0–40)
BILIRUB SERPL-MCNC: 1 MG/DL (ref 0–1.2)
BUN SERPL-MCNC: 17 MG/DL (ref 8–27)
BUN/CREAT SERPL: 17 (ref 10–24)
CALCIUM SERPL-MCNC: 9.7 MG/DL (ref 8.6–10.2)
CHLORIDE SERPL-SCNC: 102 MMOL/L (ref 96–106)
CHOLEST SERPL-MCNC: 203 MG/DL (ref 100–199)
CO2 SERPL-SCNC: 26 MMOL/L (ref 20–29)
CREAT SERPL-MCNC: 1 MG/DL (ref 0.76–1.27)
GLOBULIN SER CALC-MCNC: 2.4 G/DL (ref 1.5–4.5)
GLUCOSE SERPL-MCNC: 92 MG/DL (ref 65–99)
HCV AB S/CO SERPL IA: <0.1 S/CO RATIO (ref 0–0.9)
HCV AB SERPL QL IA: NORMAL
HDLC SERPL-MCNC: 103 MG/DL
INTERPRETATION, 910389: NORMAL
LDLC SERPL CALC-MCNC: 90 MG/DL (ref 0–99)
POTASSIUM SERPL-SCNC: 4.1 MMOL/L (ref 3.5–5.2)
PROT SERPL-MCNC: 7 G/DL (ref 6–8.5)
PSA SERPL-MCNC: 0.4 NG/ML (ref 0–4)
REFLEX CRITERIA: NORMAL
SODIUM SERPL-SCNC: 141 MMOL/L (ref 134–144)
TRIGL SERPL-MCNC: 49 MG/DL (ref 0–149)
VLDLC SERPL CALC-MCNC: 10 MG/DL (ref 5–40)

## 2019-09-17 ENCOUNTER — TELEPHONE (OUTPATIENT)
Dept: INTERNAL MEDICINE CLINIC | Age: 64
End: 2019-09-17

## 2019-09-17 NOTE — TELEPHONE ENCOUNTER
Elizabeth Courser [facility] Front Office Pool         Pt stated he was billed for the EKG on 7/12/19 and he is requesting for you to call him. He was told the EKG would be covered by his insurance. Pts number is [phone].

## 2020-10-19 ENCOUNTER — OFFICE VISIT (OUTPATIENT)
Dept: INTERNAL MEDICINE CLINIC | Age: 65
End: 2020-10-19
Payer: MEDICARE

## 2020-10-19 VITALS
HEART RATE: 67 BPM | TEMPERATURE: 98.2 F | HEIGHT: 67 IN | RESPIRATION RATE: 12 BRPM | WEIGHT: 132.4 LBS | OXYGEN SATURATION: 99 % | SYSTOLIC BLOOD PRESSURE: 142 MMHG | BODY MASS INDEX: 20.78 KG/M2 | DIASTOLIC BLOOD PRESSURE: 90 MMHG

## 2020-10-19 DIAGNOSIS — R03.0 ELEVATED BLOOD PRESSURE READING WITHOUT DIAGNOSIS OF HYPERTENSION: ICD-10-CM

## 2020-10-19 DIAGNOSIS — Z00.00 INITIAL MEDICARE ANNUAL WELLNESS VISIT: Primary | ICD-10-CM

## 2020-10-19 DIAGNOSIS — H61.21 IMPACTED CERUMEN OF RIGHT EAR: ICD-10-CM

## 2020-10-19 DIAGNOSIS — E55.9 VITAMIN D DEFICIENCY: ICD-10-CM

## 2020-10-19 LAB
25(OH)D3 SERPL-MCNC: 51.9 NG/ML (ref 30–100)
ALBUMIN SERPL-MCNC: 4.3 G/DL (ref 3.5–5)
ALBUMIN/GLOB SERPL: 1.5 {RATIO} (ref 1.1–2.2)
ALP SERPL-CCNC: 37 U/L (ref 45–117)
ALT SERPL-CCNC: 32 U/L (ref 12–78)
ANION GAP SERPL CALC-SCNC: 5 MMOL/L (ref 5–15)
AST SERPL-CCNC: 27 U/L (ref 15–37)
BILIRUB SERPL-MCNC: 1.1 MG/DL (ref 0.2–1)
BUN SERPL-MCNC: 13 MG/DL (ref 6–20)
BUN/CREAT SERPL: 13 (ref 12–20)
CALCIUM SERPL-MCNC: 9.7 MG/DL (ref 8.5–10.1)
CHLORIDE SERPL-SCNC: 105 MMOL/L (ref 97–108)
CHOLEST SERPL-MCNC: 222 MG/DL
CO2 SERPL-SCNC: 30 MMOL/L (ref 21–32)
CREAT SERPL-MCNC: 0.99 MG/DL (ref 0.7–1.3)
ERYTHROCYTE [DISTWIDTH] IN BLOOD BY AUTOMATED COUNT: 13.6 % (ref 11.5–14.5)
GLOBULIN SER CALC-MCNC: 2.9 G/DL (ref 2–4)
GLUCOSE SERPL-MCNC: 88 MG/DL (ref 65–100)
HCT VFR BLD AUTO: 46.5 % (ref 36.6–50.3)
HDLC SERPL-MCNC: 114 MG/DL
HDLC SERPL: 1.9 {RATIO} (ref 0–5)
HGB BLD-MCNC: 15.1 G/DL (ref 12.1–17)
LDLC SERPL CALC-MCNC: 97.8 MG/DL (ref 0–100)
LIPID PROFILE,FLP: ABNORMAL
MCH RBC QN AUTO: 32.4 PG (ref 26–34)
MCHC RBC AUTO-ENTMCNC: 32.5 G/DL (ref 30–36.5)
MCV RBC AUTO: 99.8 FL (ref 80–99)
NRBC # BLD: 0 K/UL (ref 0–0.01)
NRBC BLD-RTO: 0 PER 100 WBC
PLATELET # BLD AUTO: 217 K/UL (ref 150–400)
PMV BLD AUTO: 10.6 FL (ref 8.9–12.9)
POTASSIUM SERPL-SCNC: 4.7 MMOL/L (ref 3.5–5.1)
PROT SERPL-MCNC: 7.2 G/DL (ref 6.4–8.2)
RBC # BLD AUTO: 4.66 M/UL (ref 4.1–5.7)
SODIUM SERPL-SCNC: 140 MMOL/L (ref 136–145)
TRIGL SERPL-MCNC: 51 MG/DL (ref ?–150)
VLDLC SERPL CALC-MCNC: 10.2 MG/DL
WBC # BLD AUTO: 3.4 K/UL (ref 4.1–11.1)

## 2020-10-19 PROCEDURE — G8420 CALC BMI NORM PARAMETERS: HCPCS | Performed by: INTERNAL MEDICINE

## 2020-10-19 PROCEDURE — G8432 DEP SCR NOT DOC, RNG: HCPCS | Performed by: INTERNAL MEDICINE

## 2020-10-19 PROCEDURE — G8536 NO DOC ELDER MAL SCRN: HCPCS | Performed by: INTERNAL MEDICINE

## 2020-10-19 PROCEDURE — 1101F PT FALLS ASSESS-DOCD LE1/YR: CPT | Performed by: INTERNAL MEDICINE

## 2020-10-19 PROCEDURE — G8427 DOCREV CUR MEDS BY ELIG CLIN: HCPCS | Performed by: INTERNAL MEDICINE

## 2020-10-19 PROCEDURE — G0438 PPPS, INITIAL VISIT: HCPCS | Performed by: INTERNAL MEDICINE

## 2020-10-19 PROCEDURE — 69209 REMOVE IMPACTED EAR WAX UNI: CPT | Performed by: INTERNAL MEDICINE

## 2020-10-19 PROCEDURE — 3017F COLORECTAL CA SCREEN DOC REV: CPT | Performed by: INTERNAL MEDICINE

## 2020-10-19 NOTE — PROGRESS NOTES
This is a Initial Medicare Annual Wellness Visit providing Personalized Prevention Plan Services (PPPS)    I have reviewed the patient's medical history in detail and updated the computerized patient record. Doing well. Grandchild due to January in [place]. Blood pressure is mildly elevated today. 142/90 on repeat test.  No known history of hypertension but has documented whitecoat effect. He does not check his blood pressure at home. Denies any headaches chest pain or edema. He is exercising more. Reports bilateral ear pressure, decreased hearing on the right. Has been using drops for the past few days. History     Past Medical History:   Diagnosis Date    Cerumen impaction     Dermatitis     Dr. 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 hx.  2 polyps, Dr. Griffin Gosselin       Past Surgical History:   Procedure Laterality Date    COLONOSCOPY  12.23.09    2 tubular adenomas, Dr. Griffin Gosselin, repeat 12/2012    HX COLONOSCOPY  7/2013    HX COLONOSCOPY  07/30/2018    2 polyps. Dr. Griffin Gosselin    HX FREE SKIN GRAFT      legs       Current Outpatient Medications   Medication Sig    cholecalciferol (VITAMIN D3) 1,000 unit cap Take 2,000 Units by mouth daily. No current facility-administered medications for this visit. Allergies   Allergen Reactions    Shellfish Containing Products Hives       Family History   Adopted: Yes   Problem Relation Age of Onset    No Known Problems Daughter     No Known Problems Son         reports that he has never smoked. He has never used smokeless tobacco.   reports current alcohol use of about 11.0 standard drinks of alcohol per week. Depression Risk Factor Screening:       Alcohol Risk Factor Screening: On any occasion during the past 3 months, have you had more than 3 drinks containing alcohol?   No    Do you average more than 14 drinks per week? No      Functional Ability and Level of Safety:     Hearing Loss   mild    Activities of Daily Living   Self-care. Requires assistance with: no ADLs    Fall Risk     Fall Risk Assessment, last 12 mths 7/12/2019   Able to walk? Yes   Fall in past 12 months? No         Abuse Screen   Patient is not abused    Review of Systems   A comprehensive review of systems was negative except for that written in the HPI. Physical Examination     Evaluation of Cognitive Function:  Mood/affect:  neutral, happy  Appearance: age appropriate  Family member/caregiver input: none    Blood pressure (!) 142/90, pulse 67, temperature 98.2 °F (36.8 °C), temperature source Oral, resp. rate 12, height 5' 7\" (1.702 m), weight 132 lb 6.4 oz (60.1 kg), SpO2 99 %. General appearance: alert, cooperative, no distress, appears stated age  Complete cerumen impaction on the right. Partial cerumen impaction on the left  Neck: supple, symmetrical, trachea midline, no adenopathy, thyroid: not enlarged, symmetric, no tenderness/mass/nodules, no carotid bruit and no JVD  Lungs: clear to auscultation bilaterally  Heart: regular rate and rhythm, S1, S2 normal, no murmur, click, rub or gallop  Extremities: extremities normal, atraumatic, no cyanosis or edema   Appears anxious    Patient Care Team:  Charles Drake MD as PCP - [facility], Rishi Ascencio MD as PCP - [facility]      Advice/Referrals/Counseling   Education and counseling provided. See below for specific orders    Assessment/Plan   Diagnoses and all orders for this visit:    1. Initial Medicare annual wellness visit  He is up-to-date on preventative care. -     LIPID PANEL; Future  -     CBC W/O DIFF; Future  -     METABOLIC PANEL, COMPREHENSIVE; Future  -     PSA W/ REFLX FREE PSA; Future    2. Impacted cerumen of right ear  -     REMOVAL IMPACTED CERUMEN IRRIGATION/LVG UNILAT  Ceruminosis is noted. Wax is removed by syringing and manual debridement. Instructions for home care to prevent wax buildup are given. 3. Vitamin D deficiency  -     VITAMIN D, 25 HYDROXY; Future    4. Elevated blood pressure reading without diagnosis of hypertension  -Purchase a blood pressure cuff that goes around your upper arm and check blood pressure at least 3 times per week. Write down your numbers for review. Check blood pressure in the morning and evening. Rest for 5 minutes with feet elevated and arm resting on a table (at mid-chest level) when checking blood pressure  -Exercise 30-60 minutes most days of the week, preferably with a mix of cardiovascular and strength training. Exercise can improve blood pressure, even if you do not lose weight!  -Limit alcohol intake to less than 2-3 drinks daily   -Avoid tobacco products  -Avoid caffeine, energy drinks, stimulants  -DASH diet - includes fruits, vegetables, fiber, and less than 2000 mg sodium (salt) daily. Try to eat less than 6315-1204 calories daily. Limit fat intake.    -Avoid non-steroidal inflammatory medications (NSAIDS) such as ibuprofen, advil, motrin, aleve, and naproxyn as these may increase blood pressure if used long-term  -Avoid OTC decongestants such as pseudopherine, phenylephrine, Afrin\  . Potential medication side effects were discussed with the patient; let me know if any occur.   Return for yearly Annual Wellness Visits

## 2020-10-20 LAB
PSA SERPL-MCNC: 0.3 NG/ML (ref 0–4)
REFLEX CRITERIA: NORMAL

## 2021-09-29 NOTE — TELEPHONE ENCOUNTER
September 29, 2021       Yancy Wiseman  [address] [place] [address]        Dear Ms. Wiseman,    The results of the following Laboratory tests have returned:              Other information:    Normal Complete Blood Count.   Hemoglobin normal.    If you have any questions or concerns regarding this letter, please do not hesitate to call.    Sincerely,    Tavon Bear MD  [facility]  [address], [address]  [place] [address] [phone]       Sent message to billing department for them to assist.

## 2021-10-21 ENCOUNTER — OFFICE VISIT (OUTPATIENT)
Dept: INTERNAL MEDICINE CLINIC | Age: 66
End: 2021-10-21
Payer: MEDICARE

## 2021-10-21 VITALS
SYSTOLIC BLOOD PRESSURE: 132 MMHG | TEMPERATURE: 97.8 F | OXYGEN SATURATION: 100 % | DIASTOLIC BLOOD PRESSURE: 80 MMHG | RESPIRATION RATE: 14 BRPM | HEIGHT: 67 IN | BODY MASS INDEX: 20.62 KG/M2 | WEIGHT: 131.4 LBS | HEART RATE: 64 BPM

## 2021-10-21 DIAGNOSIS — Z00.00 MEDICARE ANNUAL WELLNESS VISIT, SUBSEQUENT: Primary | ICD-10-CM

## 2021-10-21 DIAGNOSIS — E55.9 VITAMIN D DEFICIENCY: ICD-10-CM

## 2021-10-21 DIAGNOSIS — R03.0 ELEVATED BLOOD PRESSURE READING WITHOUT DIAGNOSIS OF HYPERTENSION: ICD-10-CM

## 2021-10-21 DIAGNOSIS — Z13.6 SCREENING FOR CARDIOVASCULAR CONDITION: ICD-10-CM

## 2021-10-21 PROBLEM — I10 BENIGN ESSENTIAL HTN: Status: ACTIVE | Noted: 2021-10-21

## 2021-10-21 PROCEDURE — G8536 NO DOC ELDER MAL SCRN: HCPCS | Performed by: INTERNAL MEDICINE

## 2021-10-21 PROCEDURE — G0439 PPPS, SUBSEQ VISIT: HCPCS | Performed by: INTERNAL MEDICINE

## 2021-10-21 PROCEDURE — G8427 DOCREV CUR MEDS BY ELIG CLIN: HCPCS | Performed by: INTERNAL MEDICINE

## 2021-10-21 PROCEDURE — G8420 CALC BMI NORM PARAMETERS: HCPCS | Performed by: INTERNAL MEDICINE

## 2021-10-21 PROCEDURE — 3017F COLORECTAL CA SCREEN DOC REV: CPT | Performed by: INTERNAL MEDICINE

## 2021-10-21 PROCEDURE — G8510 SCR DEP NEG, NO PLAN REQD: HCPCS | Performed by: INTERNAL MEDICINE

## 2021-10-21 PROCEDURE — 1101F PT FALLS ASSESS-DOCD LE1/YR: CPT | Performed by: INTERNAL MEDICINE

## 2021-10-21 NOTE — PROGRESS NOTES
This is a Initial Medicare Annual Wellness Visit providing Personalized Prevention Plan Services (PPPS)    I have reviewed the patient's medical history in detail and updated the computerized patient record. Continues to do well. Granddaughter was born in [place] in January and he has traveled to see her. Blood pressure was again elevated on triage, but improved to 132/80 when I checked it later on. No known history of hypertension but has documented whitecoat effect. He does not check his blood pressure at home. Denies any headaches chest pain or edema. History     Past Medical History:   Diagnosis Date    Benign essential HTN     variable BP due to anxiety, white coat    Cerumen impaction     Dermatitis     Dr. 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 hx.  2 polyps, Dr. Olvin West       Past Surgical History:   Procedure Laterality Date    COLONOSCOPY  12.23.09    2 tubular adenomas, Dr. Olvin West, repeat 12/2012    HX COLONOSCOPY  7/2013    HX COLONOSCOPY  07/30/2018    2 polyps. Dr. Olvin West    HX FREE SKIN GRAFT      legs       Current Outpatient Medications   Medication Sig    ferrous fumarate/vit Bcomp,C (SUPER B COMPLEX PO) Take  by mouth.  zinc 50 mg tab tablet Take  by mouth daily.  cholecalciferol (VITAMIN D3) 1,000 unit cap Take 2,000 Units by mouth daily. No current facility-administered medications for this visit. Allergies   Allergen Reactions    Shellfish Containing Products Hives       Family History   Adopted: Yes   Problem Relation Age of Onset    No Known Problems Daughter     No Known Problems Son         reports that he has never smoked. He has never used smokeless tobacco.   reports current alcohol use of about 11.0 standard drinks of alcohol per week. Depression Risk Factor Screening:       Alcohol Risk Factor Screening:    On any occasion during the past 3 months, have you had more than 3 drinks containing alcohol? No    Do you average more than 14 drinks per week? No      Functional Ability and Level of Safety:     Hearing Loss   mild    Activities of Daily Living   Self-care. Requires assistance with: no ADLs    Fall Risk     Fall Risk Assessment, last 12 mths 10/21/2021   Able to walk? Yes   Fall in past 12 months? 0   Do you feel unsteady? 0   Are you worried about falling 0         Abuse Screen   Patient is not abused    Review of Systems   A comprehensive review of systems was negative except for that written in the HPI. Physical Examination     Evaluation of Cognitive Function:  Mood/affect:  neutral, happy  Appearance: age appropriate  Family member/caregiver input: none    Blood pressure 132/80, pulse 64, temperature 97.8 °F (36.6 °C), temperature source Oral, resp. rate 14, height 5' 7\" (1.702 m), weight 131 lb 6.4 oz (59.6 kg), SpO2 100 %. General appearance: alert, cooperative, no distress, appears stated age  Complete cerumen impaction on the right. Partial cerumen impaction on the left  Neck: supple, symmetrical, trachea midline, no adenopathy, thyroid: not enlarged, symmetric, no tenderness/mass/nodules, no carotid bruit and no JVD  Lungs: clear to auscultation bilaterally  Heart: regular rate and rhythm, S1, S2 normal, no murmur, click, rub or gallop  Extremities: extremities normal, atraumatic, no cyanosis or edema   Pleasant, mildly anxious    Patient Care Team:  Kendra Sepulveda MD as PCP - [facility], Terese Hernandez MD as PCP - [facility] Empaneled Provider      Advice/Referrals/Counseling   Education and counseling provided. See below for specific orders    Diagnoses and all orders for this visit:    1. Medicare annual wellness visit, subsequent  Update preventative services. Check PSA.  -     PSA W/ REFLX FREE PSA; Future    2. Screening for cardiovascular condition  -     LIPID PANEL; Future    3.  Elevated blood pressure reading without diagnosis of hypertension  Whitecoat effects. He continues to be very active, has reduced sodium intake. I do think we need to monitor this closely. He seems somewhat reluctant to start medication and I cannot strongly argue that is needed, but this purchased home blood pressure cuffs have also yielded high blood pressure results at times and I think we need to consider medication to prevent spikes in blood pressure which certainly can lead to increased cardiovascular risk overall. .  -     LIPID PANEL; Future  -     CBC W/O DIFF; Future  -     METABOLIC PANEL, COMPREHENSIVE; Future    4. Vitamin D deficiency  Taking vitamin D supplement. -     VITAMIN D, 25 HYDROXY; Future        Potential medication side effects were discussed with the patient; let me know if any occur.   Return for yearly Annual Wellness Visits

## 2021-10-22 LAB
25(OH)D3 SERPL-MCNC: 45.4 NG/ML (ref 30–100)
ALBUMIN SERPL-MCNC: 4 G/DL (ref 3.5–5)
ALBUMIN/GLOB SERPL: 1.3 {RATIO} (ref 1.1–2.2)
ALP SERPL-CCNC: 38 U/L (ref 45–117)
ALT SERPL-CCNC: 34 U/L (ref 12–78)
ANION GAP SERPL CALC-SCNC: 7 MMOL/L (ref 5–15)
AST SERPL-CCNC: 27 U/L (ref 15–37)
BILIRUB SERPL-MCNC: 1.3 MG/DL (ref 0.2–1)
BUN SERPL-MCNC: 18 MG/DL (ref 6–20)
BUN/CREAT SERPL: 16 (ref 12–20)
CALCIUM SERPL-MCNC: 9.4 MG/DL (ref 8.5–10.1)
CHLORIDE SERPL-SCNC: 105 MMOL/L (ref 97–108)
CHOLEST SERPL-MCNC: 206 MG/DL
CO2 SERPL-SCNC: 29 MMOL/L (ref 21–32)
CREAT SERPL-MCNC: 1.1 MG/DL (ref 0.7–1.3)
ERYTHROCYTE [DISTWIDTH] IN BLOOD BY AUTOMATED COUNT: 13.9 % (ref 11.5–14.5)
GLOBULIN SER CALC-MCNC: 3.1 G/DL (ref 2–4)
GLUCOSE SERPL-MCNC: 95 MG/DL (ref 65–100)
HCT VFR BLD AUTO: 48.4 % (ref 36.6–50.3)
HDLC SERPL-MCNC: 102 MG/DL
HDLC SERPL: 2 {RATIO} (ref 0–5)
HGB BLD-MCNC: 15.2 G/DL (ref 12.1–17)
LDLC SERPL CALC-MCNC: 96 MG/DL (ref 0–100)
MCH RBC QN AUTO: 32.2 PG (ref 26–34)
MCHC RBC AUTO-ENTMCNC: 31.4 G/DL (ref 30–36.5)
MCV RBC AUTO: 102.5 FL (ref 80–99)
NRBC # BLD: 0 K/UL (ref 0–0.01)
NRBC BLD-RTO: 0 PER 100 WBC
PLATELET # BLD AUTO: 228 K/UL (ref 150–400)
PMV BLD AUTO: 11.4 FL (ref 8.9–12.9)
POTASSIUM SERPL-SCNC: 4.5 MMOL/L (ref 3.5–5.1)
PROT SERPL-MCNC: 7.1 G/DL (ref 6.4–8.2)
RBC # BLD AUTO: 4.72 M/UL (ref 4.1–5.7)
SODIUM SERPL-SCNC: 141 MMOL/L (ref 136–145)
TRIGL SERPL-MCNC: 40 MG/DL (ref ?–150)
VLDLC SERPL CALC-MCNC: 8 MG/DL
WBC # BLD AUTO: 3.5 K/UL (ref 4.1–11.1)

## 2021-10-23 LAB
PSA SERPL-MCNC: 0.3 NG/ML (ref 0–4)
REFLEX CRITERIA: NORMAL

## 2022-03-18 PROBLEM — I10 BENIGN ESSENTIAL HTN: Status: ACTIVE | Noted: 2021-10-21

## 2022-10-25 ENCOUNTER — OFFICE VISIT (OUTPATIENT)
Dept: INTERNAL MEDICINE CLINIC | Age: 67
End: 2022-10-25
Payer: MEDICARE

## 2022-10-25 VITALS
HEART RATE: 71 BPM | OXYGEN SATURATION: 99 % | WEIGHT: 137.2 LBS | HEIGHT: 67 IN | RESPIRATION RATE: 16 BRPM | BODY MASS INDEX: 21.53 KG/M2 | DIASTOLIC BLOOD PRESSURE: 85 MMHG | TEMPERATURE: 98 F | SYSTOLIC BLOOD PRESSURE: 140 MMHG

## 2022-10-25 DIAGNOSIS — R03.0 ELEVATED BLOOD PRESSURE READING WITHOUT DIAGNOSIS OF HYPERTENSION: ICD-10-CM

## 2022-10-25 DIAGNOSIS — Z13.6 SCREENING FOR CARDIOVASCULAR CONDITION: ICD-10-CM

## 2022-10-25 DIAGNOSIS — Z00.00 MEDICARE ANNUAL WELLNESS VISIT, SUBSEQUENT: Primary | ICD-10-CM

## 2022-10-25 DIAGNOSIS — Z12.5 SCREENING PSA (PROSTATE SPECIFIC ANTIGEN): ICD-10-CM

## 2022-10-25 DIAGNOSIS — D22.39 ATYPICAL NEVUS OF CHEEK: ICD-10-CM

## 2022-10-25 PROCEDURE — G8420 CALC BMI NORM PARAMETERS: HCPCS | Performed by: INTERNAL MEDICINE

## 2022-10-25 PROCEDURE — 3017F COLORECTAL CA SCREEN DOC REV: CPT | Performed by: INTERNAL MEDICINE

## 2022-10-25 PROCEDURE — G8753 SYS BP > OR = 140: HCPCS | Performed by: INTERNAL MEDICINE

## 2022-10-25 PROCEDURE — G8427 DOCREV CUR MEDS BY ELIG CLIN: HCPCS | Performed by: INTERNAL MEDICINE

## 2022-10-25 PROCEDURE — G0439 PPPS, SUBSEQ VISIT: HCPCS | Performed by: INTERNAL MEDICINE

## 2022-10-25 PROCEDURE — G8510 SCR DEP NEG, NO PLAN REQD: HCPCS | Performed by: INTERNAL MEDICINE

## 2022-10-25 PROCEDURE — G8754 DIAS BP LESS 90: HCPCS | Performed by: INTERNAL MEDICINE

## 2022-10-25 PROCEDURE — G8536 NO DOC ELDER MAL SCRN: HCPCS | Performed by: INTERNAL MEDICINE

## 2022-10-25 PROCEDURE — 1101F PT FALLS ASSESS-DOCD LE1/YR: CPT | Performed by: INTERNAL MEDICINE

## 2022-10-25 RX ORDER — TETANUS TOXOID, REDUCED DIPHTHERIA TOXOID AND ACELLULAR PERTUSSIS VACCINE, ADSORBED 5; 2.5; 8; 8; 2.5 [IU]/.5ML; [IU]/.5ML; UG/.5ML; UG/.5ML; UG/.5ML
0.5 SUSPENSION INTRAMUSCULAR ONCE
Qty: 1 EACH | Refills: 0 | Status: SHIPPED | OUTPATIENT
Start: 2022-10-25 | End: 2022-10-25

## 2022-10-25 NOTE — PROGRESS NOTES
This is a Subsequent Medicare Annual Wellness Visit providing Personalized Prevention Plan Services (PPPS) (Performed 12 months after initial AWV and PPPS )    I have reviewed the patient's medical history in detail and updated the computerized patient record. He has been doing well. Enjoying golfing. Seeing dermatology. 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 Pathology pending. Blood pressure is once again elevated on triage. Somewhat improved on repeat. States that he checks his blood pressure outside of the office and it is usually good. It is variable in the office. History     Past Medical History:   Diagnosis Date    Atypical nevus of cheek 2022    [facility] [place]. left cheek    Benign essential HTN     variable BP due to anxiety, white coat    Cerumen impaction     Dermatitis     Dr. 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 hx.  2 polyps, Dr. Topher Dominguez       Past Surgical History:   Procedure Laterality Date    COLONOSCOPY  12.23.09    2 tubular adenomas, Dr. Topher Dominguez, repeat 12/2012    HX COLONOSCOPY  7/2013    HX COLONOSCOPY  07/30/2018    2 polyps. Dr. Harding Saruiz FREE SKIN GRAFT      legs       Current Outpatient Medications   Medication Sig    diphth, pertus,acell,, tetanus (Boostrix Tdap) 2.5-8-5 Lf-mcg-Lf/0.5mL syrg 0.5 mL by IntraMUSCular route once for 1 dose. Indications: vaccination to prevent diphtheria, pertussis and tetanus    ferrous fumarate/vit Bcomp,C (SUPER B COMPLEX PO) Take  by mouth. zinc 50 mg tab tablet Take  by mouth daily. cholecalciferol (VITAMIN D3) 25 mcg (1,000 unit) cap Take 2,000 Units by mouth daily. No current facility-administered medications for this visit.        Allergies   Allergen Reactions    Shellfish Containing Products Hives       Family History   Adopted: Yes   Problem Relation Age of Onset    No Known Problems Daughter     No Known Problems Son         reports that he has never smoked. He has never used smokeless tobacco.   reports current alcohol use of about 11.0 standard drinks per week. Depression Risk Factor Screening:       Alcohol Risk Factor Screening: On any occasion during the past 3 months, have you had more than 3 drinks containing alcohol? No    Do you average more than 14 drinks per week? No      Functional Ability and Level of Safety:     Hearing Loss   mild    Activities of Daily Living   Self-care. Requires assistance with: no ADLs    Fall Risk     Fall Risk Assessment, last 12 mths 10/25/2022   Able to walk? Yes   Fall in past 12 months? 0   Do you feel unsteady? 0   Are you worried about falling 0         Abuse Screen   Patient is not abused    Review of Systems   A comprehensive review of systems was negative except for that written in the HPI. Physical Examination     Evaluation of Cognitive Function:  Mood/affect:  neutral, happy  Appearance: age appropriate  Family member/caregiver input: none    Blood pressure (!) 140/85, pulse 71, temperature 98 °F (36.7 °C), temperature source Oral, resp. rate 16, height 5' 7\" (1.702 m), weight 137 lb 3.2 oz (62.2 kg), SpO2 99 %. General appearance: alert, cooperative, no distress, appears stated age  Neck: supple, symmetrical, trachea midline, no adenopathy, thyroid: not enlarged, symmetric, no tenderness/mass/nodules, no carotid bruit and no JVD  Lungs: clear to auscultation bilaterally  Heart: regular rate and rhythm, S1, S2 normal, no murmur, click, rub or gallop  Extremities: extremities normal, atraumatic, no cyanosis or edema    Patient Care Team:  Razia Javier MD as PCP - [facility], Nisha Bermudez MD as PCP - [facility] Empaneled Provider      Advice/Referrals/Counseling   Education and counseling provided. See below for specific orders    Assessment/Plan         Diagnoses and all orders for this visit:    1.  Medicare annual wellness visit, subsequent  I recommend Tdap vaccination. I recommend COVID-19 omicron booster. He will get them at the pharmacy. -     diphth, pertus,acell,, tetanus (Boostrix Tdap) 2.5-8-5 Lf-mcg-Lf/0.5mL syrg; 0.5 mL by IntraMUSCular route once for 1 dose. Indications: vaccination to prevent diphtheria, pertussis and tetanus  ACP reviewed. Primary medical decision maker reviewed with patient and updated in chart. he is otherwise up-to-date or have declined preventative services except for those ordered below. He states that his daughter, Tracy Dies, as it is medical power of . She lives around here. He has an older son who lives in [place]. 2. Screening for cardiovascular condition  -     LIPID PANEL; Future    3. Screening PSA (prostate specific antigen)  SHARON not performed today. .  -     PSA SCREENING (SCREENING); Future    4. Elevated blood pressure reading without diagnosis of hypertension  Continue to monitor blood pressure. He does have significant whitecoat effect. Would recommend reaching back out to me if his blood pressures are sometimes above 140 at home. Even if blood pressures are usually above 130, medical therapy is indicated. -     CBC W/O DIFF; Future  -     METABOLIC PANEL, COMPREHENSIVE; Future    5. Atypical nevus of cheek  Per dermatology. Results pending. Potential medication side effects were discussed with the patient; let me know if any occur.   Return for yearly Annual Wellness Visits

## 2022-10-26 LAB
ALBUMIN SERPL-MCNC: 3.9 G/DL (ref 3.5–5)
ALBUMIN/GLOB SERPL: 1.2 {RATIO} (ref 1.1–2.2)
ALP SERPL-CCNC: 36 U/L (ref 45–117)
ALT SERPL-CCNC: 28 U/L (ref 12–78)
ANION GAP SERPL CALC-SCNC: 8 MMOL/L (ref 5–15)
AST SERPL-CCNC: 24 U/L (ref 15–37)
BILIRUB SERPL-MCNC: 0.7 MG/DL (ref 0.2–1)
BUN SERPL-MCNC: 15 MG/DL (ref 6–20)
BUN/CREAT SERPL: 17 (ref 12–20)
CALCIUM SERPL-MCNC: 9.2 MG/DL (ref 8.5–10.1)
CHLORIDE SERPL-SCNC: 106 MMOL/L (ref 97–108)
CHOLEST SERPL-MCNC: 201 MG/DL
CO2 SERPL-SCNC: 24 MMOL/L (ref 21–32)
CREAT SERPL-MCNC: 0.88 MG/DL (ref 0.7–1.3)
ERYTHROCYTE [DISTWIDTH] IN BLOOD BY AUTOMATED COUNT: 13.5 % (ref 11.5–14.5)
GLOBULIN SER CALC-MCNC: 3.2 G/DL (ref 2–4)
GLUCOSE SERPL-MCNC: 104 MG/DL (ref 65–100)
HCT VFR BLD AUTO: 45.4 % (ref 36.6–50.3)
HDLC SERPL-MCNC: 72 MG/DL
HDLC SERPL: 2.8 {RATIO} (ref 0–5)
HGB BLD-MCNC: 14.7 G/DL (ref 12.1–17)
LDLC SERPL CALC-MCNC: 113.8 MG/DL (ref 0–100)
MCH RBC QN AUTO: 32.2 PG (ref 26–34)
MCHC RBC AUTO-ENTMCNC: 32.4 G/DL (ref 30–36.5)
MCV RBC AUTO: 99.3 FL (ref 80–99)
NRBC # BLD: 0 K/UL (ref 0–0.01)
NRBC BLD-RTO: 0 PER 100 WBC
PLATELET # BLD AUTO: 237 K/UL (ref 150–400)
PMV BLD AUTO: 11 FL (ref 8.9–12.9)
POTASSIUM SERPL-SCNC: 4.3 MMOL/L (ref 3.5–5.1)
PROT SERPL-MCNC: 7.1 G/DL (ref 6.4–8.2)
PSA SERPL-MCNC: 0.4 NG/ML (ref 0.01–4)
RBC # BLD AUTO: 4.57 M/UL (ref 4.1–5.7)
SODIUM SERPL-SCNC: 138 MMOL/L (ref 136–145)
TRIGL SERPL-MCNC: 76 MG/DL (ref ?–150)
VLDLC SERPL CALC-MCNC: 15.2 MG/DL
WBC # BLD AUTO: 3.6 K/UL (ref 4.1–11.1)

## 2022-11-08 ENCOUNTER — OFFICE VISIT (OUTPATIENT)
Dept: INTERNAL MEDICINE CLINIC | Age: 67
End: 2022-11-08
Payer: MEDICARE

## 2022-11-08 VITALS
HEIGHT: 67 IN | DIASTOLIC BLOOD PRESSURE: 82 MMHG | RESPIRATION RATE: 16 BRPM | SYSTOLIC BLOOD PRESSURE: 134 MMHG | OXYGEN SATURATION: 99 % | TEMPERATURE: 97.8 F | HEART RATE: 74 BPM | BODY MASS INDEX: 21.44 KG/M2 | WEIGHT: 136.6 LBS

## 2022-11-08 DIAGNOSIS — H61.23 HEARING LOSS OF BOTH EARS DUE TO CERUMEN IMPACTION: Primary | ICD-10-CM

## 2022-11-08 PROCEDURE — 1101F PT FALLS ASSESS-DOCD LE1/YR: CPT | Performed by: INTERNAL MEDICINE

## 2022-11-08 PROCEDURE — 69209 REMOVE IMPACTED EAR WAX UNI: CPT | Performed by: INTERNAL MEDICINE

## 2022-11-08 PROCEDURE — G8536 NO DOC ELDER MAL SCRN: HCPCS | Performed by: INTERNAL MEDICINE

## 2022-11-08 PROCEDURE — G0463 HOSPITAL OUTPT CLINIC VISIT: HCPCS | Performed by: INTERNAL MEDICINE

## 2022-11-08 PROCEDURE — G8752 SYS BP LESS 140: HCPCS | Performed by: INTERNAL MEDICINE

## 2022-11-08 PROCEDURE — G8754 DIAS BP LESS 90: HCPCS | Performed by: INTERNAL MEDICINE

## 2022-11-08 PROCEDURE — 99213 OFFICE O/P EST LOW 20 MIN: CPT | Performed by: INTERNAL MEDICINE

## 2022-11-08 PROCEDURE — G8510 SCR DEP NEG, NO PLAN REQD: HCPCS | Performed by: INTERNAL MEDICINE

## 2022-11-08 PROCEDURE — G8420 CALC BMI NORM PARAMETERS: HCPCS | Performed by: INTERNAL MEDICINE

## 2022-11-08 PROCEDURE — G8427 DOCREV CUR MEDS BY ELIG CLIN: HCPCS | Performed by: INTERNAL MEDICINE

## 2022-11-08 PROCEDURE — 3017F COLORECTAL CA SCREEN DOC REV: CPT | Performed by: INTERNAL MEDICINE

## 2022-11-08 NOTE — PROGRESS NOTES
HISTORY OF PRESENT ILLNESS    Chief Complaint   Patient presents with    Ear Fullness     Bilateral         Presents with right greater than left ear fullness. Feels some pressure, decreased hearing. At his wellness exam October 25, he was found to have bilateral cerumen impactions. He has been applying Debrox to soften this. Review of Systems   All other systems reviewed and are negative, except as noted in HPI    Past Medical and Surgical History   has a past medical history of Atypical nevus of cheek (2022), Benign essential HTN, Cerumen impaction, Dermatitis, Leg injury (03/30/2007), and Tubular adenoma of colon (12/23/2009). has a past surgical history that includes hx free skin graft; colonoscopy (12.23.09); hx colonoscopy (7/2013); and hx colonoscopy (07/30/2018). reports that he has never smoked. He has never used smokeless tobacco. He reports current alcohol use of about 11.0 standard drinks per week. He reports that he does not use drugs. family history includes No Known Problems in his daughter and son. He was adopted. Physical Exam   Nursing note and vitals reviewed. Blood pressure 134/82, pulse 74, temperature 97.8 °F (36.6 °C), temperature source Oral, resp. rate 16, height 5' 7\" (1.702 m), weight 136 lb 9.6 oz (62 kg), SpO2 99 %. Constitutional: In no distress. Eyes: Conjunctivae are normal.  HEENT:  No LAD or thyromegaly   Cardiovascular: Normal rate. regular rhythm. No murmurs  No edema  Pulmonary/Chest: Effort normal. clear to ausculation blaterally  Musculoskeletal:  no edema. Abd:  Neurological: Alert and oriented. Grossly intact cranial nerves and motor function. Skin: No visible rash noted. Psychiatric: Normal mood and affect. Behavior is normal.     ASSESSMENT and PLAN  1. Hearing loss of both ears due to cerumen impaction  -     REMOVAL IMPACTED CERUMEN IRRIGATION/LVG UNILAT  Ceruminosis is noted. Wax is removed by syringing and manual debridement. Instructions for home care to prevent wax buildup are given. ,    lab results and schedule of future lab studies reviewed with patient  reviewed medications and side effects in detail    Return to clinic for further evaluation if new symptoms develop or if current symptoms worsen or fail to resolve.

## 2023-10-25 SDOH — ECONOMIC STABILITY: INCOME INSECURITY: HOW HARD IS IT FOR YOU TO PAY FOR THE VERY BASICS LIKE FOOD, HOUSING, MEDICAL CARE, AND HEATING?: NOT HARD AT ALL

## 2023-10-25 SDOH — ECONOMIC STABILITY: TRANSPORTATION INSECURITY
IN THE PAST 12 MONTHS, HAS LACK OF TRANSPORTATION KEPT YOU FROM MEETINGS, WORK, OR FROM GETTING THINGS NEEDED FOR DAILY LIVING?: NO

## 2023-10-25 SDOH — HEALTH STABILITY: PHYSICAL HEALTH: ON AVERAGE, HOW MANY DAYS PER WEEK DO YOU ENGAGE IN MODERATE TO STRENUOUS EXERCISE (LIKE A BRISK WALK)?: 6 DAYS

## 2023-10-25 SDOH — ECONOMIC STABILITY: FOOD INSECURITY: WITHIN THE PAST 12 MONTHS, YOU WORRIED THAT YOUR FOOD WOULD RUN OUT BEFORE YOU GOT MONEY TO BUY MORE.: NEVER TRUE

## 2023-10-25 SDOH — ECONOMIC STABILITY: HOUSING INSECURITY
IN THE LAST 12 MONTHS, WAS THERE A TIME WHEN YOU DID NOT HAVE A STEADY PLACE TO SLEEP OR SLEPT IN A SHELTER (INCLUDING NOW)?: NO

## 2023-10-25 SDOH — HEALTH STABILITY: PHYSICAL HEALTH: ON AVERAGE, HOW MANY MINUTES DO YOU ENGAGE IN EXERCISE AT THIS LEVEL?: 30 MIN

## 2023-10-25 ASSESSMENT — PATIENT HEALTH QUESTIONNAIRE - PHQ9
SUM OF ALL RESPONSES TO PHQ QUESTIONS 1-9: 0
2. FEELING DOWN, DEPRESSED OR HOPELESS: 0
SUM OF ALL RESPONSES TO PHQ QUESTIONS 1-9: 0
SUM OF ALL RESPONSES TO PHQ9 QUESTIONS 1 & 2: 0
1. LITTLE INTEREST OR PLEASURE IN DOING THINGS: 0

## 2023-10-25 ASSESSMENT — LIFESTYLE VARIABLES
HOW MANY STANDARD DRINKS CONTAINING ALCOHOL DO YOU HAVE ON A TYPICAL DAY: PATIENT DECLINED
HOW OFTEN DO YOU HAVE A DRINK CONTAINING ALCOHOL: PATIENT DECLINED
HOW OFTEN DO YOU HAVE A DRINK CONTAINING ALCOHOL: 98
HOW MANY STANDARD DRINKS CONTAINING ALCOHOL DO YOU HAVE ON A TYPICAL DAY: 98
HOW OFTEN DO YOU HAVE SIX OR MORE DRINKS ON ONE OCCASION: 98

## 2023-10-26 ENCOUNTER — OFFICE VISIT (OUTPATIENT)
Age: 68
End: 2023-10-26
Payer: MEDICARE

## 2023-10-26 VITALS
TEMPERATURE: 97.8 F | BODY MASS INDEX: 21.22 KG/M2 | WEIGHT: 135.2 LBS | HEART RATE: 65 BPM | RESPIRATION RATE: 18 BRPM | HEIGHT: 67 IN | SYSTOLIC BLOOD PRESSURE: 145 MMHG | OXYGEN SATURATION: 96 % | DIASTOLIC BLOOD PRESSURE: 86 MMHG

## 2023-10-26 DIAGNOSIS — Z12.5 SCREENING FOR PROSTATE CANCER: ICD-10-CM

## 2023-10-26 DIAGNOSIS — I10 BENIGN ESSENTIAL HTN: ICD-10-CM

## 2023-10-26 DIAGNOSIS — Z00.00 MEDICARE ANNUAL WELLNESS VISIT, SUBSEQUENT: Primary | ICD-10-CM

## 2023-10-26 DIAGNOSIS — D12.6 TUBULAR ADENOMA OF COLON: ICD-10-CM

## 2023-10-26 PROCEDURE — 1123F ACP DISCUSS/DSCN MKR DOCD: CPT | Performed by: INTERNAL MEDICINE

## 2023-10-26 PROCEDURE — 3079F DIAST BP 80-89 MM HG: CPT | Performed by: INTERNAL MEDICINE

## 2023-10-26 PROCEDURE — 99213 OFFICE O/P EST LOW 20 MIN: CPT | Performed by: INTERNAL MEDICINE

## 2023-10-26 PROCEDURE — G8420 CALC BMI NORM PARAMETERS: HCPCS | Performed by: INTERNAL MEDICINE

## 2023-10-26 PROCEDURE — 3077F SYST BP >= 140 MM HG: CPT | Performed by: INTERNAL MEDICINE

## 2023-10-26 PROCEDURE — G8484 FLU IMMUNIZE NO ADMIN: HCPCS | Performed by: INTERNAL MEDICINE

## 2023-10-26 PROCEDURE — 1036F TOBACCO NON-USER: CPT | Performed by: INTERNAL MEDICINE

## 2023-10-26 PROCEDURE — 3017F COLORECTAL CA SCREEN DOC REV: CPT | Performed by: INTERNAL MEDICINE

## 2023-10-26 PROCEDURE — G8427 DOCREV CUR MEDS BY ELIG CLIN: HCPCS | Performed by: INTERNAL MEDICINE

## 2023-10-26 PROCEDURE — G0439 PPPS, SUBSEQ VISIT: HCPCS | Performed by: INTERNAL MEDICINE

## 2023-10-26 RX ORDER — ZINC GLUCONATE 50 MG
50 TABLET ORAL DAILY
COMMUNITY

## 2023-10-26 RX ORDER — VITAMIN B COMPLEX
1 CAPSULE ORAL DAILY
COMMUNITY

## 2023-10-26 SDOH — ECONOMIC STABILITY: FOOD INSECURITY: WITHIN THE PAST 12 MONTHS, THE FOOD YOU BOUGHT JUST DIDN'T LAST AND YOU DIDN'T HAVE MONEY TO GET MORE.: NEVER TRUE

## 2023-10-26 SDOH — ECONOMIC STABILITY: FOOD INSECURITY: WITHIN THE PAST 12 MONTHS, YOU WORRIED THAT YOUR FOOD WOULD RUN OUT BEFORE YOU GOT MONEY TO BUY MORE.: NEVER TRUE

## 2023-10-26 SDOH — ECONOMIC STABILITY: INCOME INSECURITY: HOW HARD IS IT FOR YOU TO PAY FOR THE VERY BASICS LIKE FOOD, HOUSING, MEDICAL CARE, AND HEATING?: NOT HARD AT ALL

## 2023-10-26 ASSESSMENT — LIFESTYLE VARIABLES
HOW OFTEN DO YOU HAVE A DRINK CONTAINING ALCOHOL: PATIENT DECLINED
HOW MANY STANDARD DRINKS CONTAINING ALCOHOL DO YOU HAVE ON A TYPICAL DAY: PATIENT DECLINED

## 2023-10-26 NOTE — PROGRESS NOTES
atraumatic  Eyes: pupils equal, round, and reactive to light, extraocular eye movements intact, conjunctivae normal  ENT: tympanic membrane, external ear and ear canal normal bilaterally, nose without deformity, nasal mucosa and turbinates normal without polyps  Neck: supple and non-tender without mass, no thyromegaly or thyroid nodules, no cervical lymphadenopathy  Pulmonary/Chest: clear to auscultation bilaterally- no wheezes, rales or rhonchi, normal air movement, no respiratory distress  Cardiovascular: normal rate, regular rhythm, normal S1 and S2, no murmurs, rubs, clicks, or gallops, distal pulses intact, no carotid bruits  Abdomen: soft, non-tender, non-distended, normal bowel sounds, no masses or organomegaly  Extremities: no cyanosis, clubbing or edema  Musculoskeletal: normal range of motion, no joint swelling, deformity or tenderness  Neurologic: reflexes normal and symmetric, no cranial nerve deficit, gait, coordination and speech normal   Appears anxious. Allergies   Allergen Reactions    Bee Venom      Other reaction(s): Unknown    Shellfish Allergy Hives     Prior to Visit Medications    Medication Sig Taking?  Authorizing Provider   b complex vitamins capsule Take 1 capsule by mouth daily Yes ProviderJacquelin MD   zinc 50 MG TABS tablet Take 1 tablet by mouth daily Yes ProviderJacquelin MD   vitamin D (CHOLECALCIFEROL) 50 MCG (2000 UT) CAPS capsule Take 1 capsule by mouth daily Yes Automatic Reconciliation, Ar       CareTeam (Including outside providers/suppliers regularly involved in providing care):   Patient Care Team:  Georgia Ivory MD as PCP - General  Port, Scott Lawler MD [facility] P[facility]     Reviewed and updated this visit:  Allergies  Meds  Problems  Med Hx  Surg Hx  Soc Hx  Fam Hx

## 2023-10-26 NOTE — PATIENT INSTRUCTIONS
[place] [employer] Aging online. You need 2569-5712 mg of calcium and 7868-7607 IU of vitamin D per day. It is possible to meet your calcium requirement with diet alone, but a vitamin D supplement is usually necessary to meet this goal.  When exposed to the sun, use a sunscreen that protects against both UVA and UVB radiation with an SPF of 30 or greater. Reapply every 2 to 3 hours or after sweating, drying off with a towel, or swimming. Always wear a seat belt when traveling in a car. Always wear a helmet when riding a bicycle or motorcycle.

## 2023-10-27 LAB
ALBUMIN SERPL-MCNC: 4.2 G/DL (ref 3.5–5)
ALBUMIN/GLOB SERPL: 1.3 (ref 1.1–2.2)
ALP SERPL-CCNC: 39 U/L (ref 45–117)
ALT SERPL-CCNC: 31 U/L (ref 12–78)
ANION GAP SERPL CALC-SCNC: 4 MMOL/L (ref 5–15)
AST SERPL-CCNC: 27 U/L (ref 15–37)
BILIRUB SERPL-MCNC: 0.8 MG/DL (ref 0.2–1)
BUN SERPL-MCNC: 11 MG/DL (ref 6–20)
BUN/CREAT SERPL: 11 (ref 12–20)
CALCIUM SERPL-MCNC: 9.4 MG/DL (ref 8.5–10.1)
CHLORIDE SERPL-SCNC: 108 MMOL/L (ref 97–108)
CHOLEST SERPL-MCNC: 218 MG/DL
CO2 SERPL-SCNC: 28 MMOL/L (ref 21–32)
CREAT SERPL-MCNC: 1 MG/DL (ref 0.7–1.3)
ERYTHROCYTE [DISTWIDTH] IN BLOOD BY AUTOMATED COUNT: 13.6 % (ref 11.5–14.5)
GLOBULIN SER CALC-MCNC: 3.2 G/DL (ref 2–4)
GLUCOSE SERPL-MCNC: 88 MG/DL (ref 65–100)
HCT VFR BLD AUTO: 46.3 % (ref 36.6–50.3)
HDLC SERPL-MCNC: 115 MG/DL
HDLC SERPL: 1.9 (ref 0–5)
HGB BLD-MCNC: 15.1 G/DL (ref 12.1–17)
LDLC SERPL CALC-MCNC: 93.8 MG/DL (ref 0–100)
MCH RBC QN AUTO: 32.1 PG (ref 26–34)
MCHC RBC AUTO-ENTMCNC: 32.6 G/DL (ref 30–36.5)
MCV RBC AUTO: 98.5 FL (ref 80–99)
NRBC # BLD: 0 K/UL (ref 0–0.01)
NRBC BLD-RTO: 0 PER 100 WBC
PLATELET # BLD AUTO: 237 K/UL (ref 150–400)
PMV BLD AUTO: 10.9 FL (ref 8.9–12.9)
POTASSIUM SERPL-SCNC: 4.2 MMOL/L (ref 3.5–5.1)
PROT SERPL-MCNC: 7.4 G/DL (ref 6.4–8.2)
PSA SERPL-MCNC: 0.5 NG/ML (ref 0.01–4)
RBC # BLD AUTO: 4.7 M/UL (ref 4.1–5.7)
SODIUM SERPL-SCNC: 140 MMOL/L (ref 136–145)
TRIGL SERPL-MCNC: 46 MG/DL
VLDLC SERPL CALC-MCNC: 9.2 MG/DL
WBC # BLD AUTO: 3.3 K/UL (ref 4.1–11.1)

## 2024-10-27 SDOH — HEALTH STABILITY: PHYSICAL HEALTH: ON AVERAGE, HOW MANY MINUTES DO YOU ENGAGE IN EXERCISE AT THIS LEVEL?: 40 MIN

## 2024-10-27 SDOH — HEALTH STABILITY: PHYSICAL HEALTH: ON AVERAGE, HOW MANY DAYS PER WEEK DO YOU ENGAGE IN MODERATE TO STRENUOUS EXERCISE (LIKE A BRISK WALK)?: 6 DAYS

## 2024-10-28 ENCOUNTER — OFFICE VISIT (OUTPATIENT)
Age: 69
End: 2024-10-28
Payer: MEDICARE

## 2024-10-28 VITALS
DIASTOLIC BLOOD PRESSURE: 82 MMHG | WEIGHT: 132.8 LBS | HEART RATE: 70 BPM | HEIGHT: 67 IN | OXYGEN SATURATION: 98 % | BODY MASS INDEX: 20.84 KG/M2 | SYSTOLIC BLOOD PRESSURE: 155 MMHG | RESPIRATION RATE: 19 BRPM | TEMPERATURE: 98 F

## 2024-10-28 DIAGNOSIS — Z12.11 SCREEN FOR COLON CANCER: ICD-10-CM

## 2024-10-28 DIAGNOSIS — Z12.5 SCREENING FOR PROSTATE CANCER: ICD-10-CM

## 2024-10-28 DIAGNOSIS — I10 BENIGN ESSENTIAL HTN: ICD-10-CM

## 2024-10-28 DIAGNOSIS — D12.6 TUBULAR ADENOMA OF COLON: ICD-10-CM

## 2024-10-28 DIAGNOSIS — Z00.00 MEDICARE ANNUAL WELLNESS VISIT, SUBSEQUENT: Primary | ICD-10-CM

## 2024-10-28 DIAGNOSIS — M25.512 ACUTE PAIN OF LEFT SHOULDER: ICD-10-CM

## 2024-10-28 LAB
ALBUMIN SERPL-MCNC: 4.2 G/DL (ref 3.5–5)
ALBUMIN/GLOB SERPL: 1.4 (ref 1.1–2.2)
ALP SERPL-CCNC: 40 U/L (ref 45–117)
ALT SERPL-CCNC: 25 U/L (ref 12–78)
ANION GAP SERPL CALC-SCNC: 4 MMOL/L (ref 2–12)
AST SERPL-CCNC: 24 U/L (ref 15–37)
BILIRUB SERPL-MCNC: 0.9 MG/DL (ref 0.2–1)
BUN SERPL-MCNC: 12 MG/DL (ref 6–20)
BUN/CREAT SERPL: 11 (ref 12–20)
CALCIUM SERPL-MCNC: 9.5 MG/DL (ref 8.5–10.1)
CHLORIDE SERPL-SCNC: 107 MMOL/L (ref 97–108)
CHOLEST SERPL-MCNC: 225 MG/DL
CO2 SERPL-SCNC: 31 MMOL/L (ref 21–32)
CREAT SERPL-MCNC: 1.06 MG/DL (ref 0.7–1.3)
ERYTHROCYTE [DISTWIDTH] IN BLOOD BY AUTOMATED COUNT: 13.5 % (ref 11.5–14.5)
GLOBULIN SER CALC-MCNC: 3.1 G/DL (ref 2–4)
GLUCOSE SERPL-MCNC: 102 MG/DL (ref 65–100)
HCT VFR BLD AUTO: 45.4 % (ref 36.6–50.3)
HDLC SERPL-MCNC: 145 MG/DL
HDLC SERPL: 1.6 (ref 0–5)
HGB BLD-MCNC: 14.9 G/DL (ref 12.1–17)
LDLC SERPL CALC-MCNC: 71.4 MG/DL (ref 0–100)
MCH RBC QN AUTO: 32 PG (ref 26–34)
MCHC RBC AUTO-ENTMCNC: 32.8 G/DL (ref 30–36.5)
MCV RBC AUTO: 97.4 FL (ref 80–99)
NRBC # BLD: 0 K/UL (ref 0–0.01)
NRBC BLD-RTO: 0 PER 100 WBC
PLATELET # BLD AUTO: 235 K/UL (ref 150–400)
PMV BLD AUTO: 10.8 FL (ref 8.9–12.9)
POTASSIUM SERPL-SCNC: 5 MMOL/L (ref 3.5–5.1)
PROT SERPL-MCNC: 7.3 G/DL (ref 6.4–8.2)
PSA SERPL-MCNC: 0.4 NG/ML (ref 0.01–4)
RBC # BLD AUTO: 4.66 M/UL (ref 4.1–5.7)
SODIUM SERPL-SCNC: 142 MMOL/L (ref 136–145)
TRIGL SERPL-MCNC: 43 MG/DL
VLDLC SERPL CALC-MCNC: 8.6 MG/DL
WBC # BLD AUTO: 3.6 K/UL (ref 4.1–11.1)

## 2024-10-28 PROCEDURE — 99213 OFFICE O/P EST LOW 20 MIN: CPT | Performed by: INTERNAL MEDICINE

## 2024-10-28 RX ORDER — DICLOFENAC SODIUM 75 MG/1
75 TABLET, DELAYED RELEASE ORAL 2 TIMES DAILY
Qty: 60 TABLET | Refills: 0 | Status: SHIPPED | OUTPATIENT
Start: 2024-10-28

## 2024-10-28 SDOH — ECONOMIC STABILITY: FOOD INSECURITY: WITHIN THE PAST 12 MONTHS, YOU WORRIED THAT YOUR FOOD WOULD RUN OUT BEFORE YOU GOT MONEY TO BUY MORE.: NEVER TRUE

## 2024-10-28 SDOH — ECONOMIC STABILITY: INCOME INSECURITY: HOW HARD IS IT FOR YOU TO PAY FOR THE VERY BASICS LIKE FOOD, HOUSING, MEDICAL CARE, AND HEATING?: NOT HARD AT ALL

## 2024-10-28 SDOH — ECONOMIC STABILITY: FOOD INSECURITY: WITHIN THE PAST 12 MONTHS, THE FOOD YOU BOUGHT JUST DIDN'T LAST AND YOU DIDN'T HAVE MONEY TO GET MORE.: NEVER TRUE

## 2024-10-28 ASSESSMENT — PATIENT HEALTH QUESTIONNAIRE - PHQ9
1. LITTLE INTEREST OR PLEASURE IN DOING THINGS: NOT AT ALL
SUM OF ALL RESPONSES TO PHQ QUESTIONS 1-9: 0
2. FEELING DOWN, DEPRESSED OR HOPELESS: NOT AT ALL
SUM OF ALL RESPONSES TO PHQ QUESTIONS 1-9: 0
SUM OF ALL RESPONSES TO PHQ QUESTIONS 1-9: 0
SUM OF ALL RESPONSES TO PHQ9 QUESTIONS 1 & 2: 0
SUM OF ALL RESPONSES TO PHQ QUESTIONS 1-9: 0

## 2024-10-28 ASSESSMENT — LIFESTYLE VARIABLES
HOW OFTEN DO YOU HAVE A DRINK CONTAINING ALCOHOL: MONTHLY OR LESS
HOW MANY STANDARD DRINKS CONTAINING ALCOHOL DO YOU HAVE ON A TYPICAL DAY: 1 OR 2

## 2024-10-28 NOTE — PROGRESS NOTES
\"Have you been to the ER, urgent care clinic since your last visit?  Hospitalized since your last visit?\"    NO    “Have you seen or consulted any other health care providers outside our system since your last visit?”    NO    “Have you had a colorectal cancer screening such as a colonoscopy/FIT/Cologuard?    NO    Date of last Colonoscopy: 7/30/2018  No cologuard on file  No FIT/FOBT on file   No flexible sigmoidoscopy on file            
Hx  Soc Hx  Fam Hx